# Patient Record
Sex: FEMALE | Race: WHITE | NOT HISPANIC OR LATINO | URBAN - METROPOLITAN AREA
[De-identification: names, ages, dates, MRNs, and addresses within clinical notes are randomized per-mention and may not be internally consistent; named-entity substitution may affect disease eponyms.]

---

## 2017-01-18 ENCOUNTER — OFFICE VISIT - HEALTHEAST (OUTPATIENT)
Dept: CARDIOLOGY | Facility: CLINIC | Age: 82
End: 2017-01-18

## 2017-01-18 DIAGNOSIS — I10 ESSENTIAL HYPERTENSION WITH GOAL BLOOD PRESSURE LESS THAN 140/90: ICD-10-CM

## 2017-01-18 DIAGNOSIS — E78.00 HYPERCHOLESTEREMIA: ICD-10-CM

## 2017-01-18 DIAGNOSIS — I25.10 CORONARY ARTERY DISEASE INVOLVING NATIVE CORONARY ARTERY OF NATIVE HEART WITHOUT ANGINA PECTORIS: ICD-10-CM

## 2017-01-18 DIAGNOSIS — I48.0 PAROXYSMAL ATRIAL FIBRILLATION (H): ICD-10-CM

## 2017-01-18 ASSESSMENT — MIFFLIN-ST. JEOR: SCORE: 1116.22

## 2017-01-27 ENCOUNTER — RECORDS - HEALTHEAST (OUTPATIENT)
Dept: LAB | Facility: CLINIC | Age: 82
End: 2017-01-27

## 2017-01-27 LAB
CHOLEST SERPL-MCNC: 93 MG/DL
FASTING STATUS PATIENT QL REPORTED: ABNORMAL
HDLC SERPL-MCNC: 47 MG/DL
LDLC SERPL CALC-MCNC: 33 MG/DL
TRIGL SERPL-MCNC: 66 MG/DL

## 2017-02-02 ENCOUNTER — AMBULATORY - HEALTHEAST (OUTPATIENT)
Dept: CARDIOLOGY | Facility: CLINIC | Age: 82
End: 2017-02-02

## 2017-02-02 ENCOUNTER — COMMUNICATION - HEALTHEAST (OUTPATIENT)
Dept: CARDIOLOGY | Facility: CLINIC | Age: 82
End: 2017-02-02

## 2017-02-02 ENCOUNTER — SURGERY - HEALTHEAST (OUTPATIENT)
Dept: CARDIOLOGY | Facility: CLINIC | Age: 82
End: 2017-02-02

## 2017-02-02 DIAGNOSIS — I48.0 PAROXYSMAL ATRIAL FIBRILLATION (H): ICD-10-CM

## 2017-02-15 ENCOUNTER — SURGERY - HEALTHEAST (OUTPATIENT)
Dept: CARDIOLOGY | Facility: CLINIC | Age: 82
End: 2017-02-15

## 2017-03-14 ENCOUNTER — COMMUNICATION - HEALTHEAST (OUTPATIENT)
Dept: CARDIOLOGY | Facility: CLINIC | Age: 82
End: 2017-03-14

## 2017-04-29 ENCOUNTER — OFFICE VISIT - HEALTHEAST (OUTPATIENT)
Dept: OCCUPATIONAL THERAPY | Facility: HOSPITAL | Age: 82
End: 2017-04-29

## 2017-05-04 ENCOUNTER — OFFICE VISIT - HEALTHEAST (OUTPATIENT)
Dept: GERIATRICS | Facility: CLINIC | Age: 82
End: 2017-05-04

## 2017-05-04 DIAGNOSIS — G47.00 INSOMNIA: ICD-10-CM

## 2017-05-04 DIAGNOSIS — F09 COGNITIVE DISORDER: ICD-10-CM

## 2017-05-04 DIAGNOSIS — G93.40 ENCEPHALOPATHY: ICD-10-CM

## 2017-05-04 DIAGNOSIS — R50.9 FEVER OF UNKNOWN ORIGIN: ICD-10-CM

## 2017-05-04 DIAGNOSIS — F41.8 ANXIETY ASSOCIATED WITH DEPRESSION: ICD-10-CM

## 2017-05-08 ENCOUNTER — AMBULATORY - HEALTHEAST (OUTPATIENT)
Dept: GERIATRICS | Facility: CLINIC | Age: 82
End: 2017-05-08

## 2017-08-30 ENCOUNTER — AMBULATORY - HEALTHEAST (OUTPATIENT)
Dept: CARDIOLOGY | Facility: CLINIC | Age: 82
End: 2017-08-30

## 2017-08-30 ENCOUNTER — RECORDS - HEALTHEAST (OUTPATIENT)
Dept: ADMINISTRATIVE | Facility: OTHER | Age: 82
End: 2017-08-30

## 2017-09-06 ENCOUNTER — AMBULATORY - HEALTHEAST (OUTPATIENT)
Dept: CARDIOLOGY | Facility: CLINIC | Age: 82
End: 2017-09-06

## 2017-09-06 ENCOUNTER — OFFICE VISIT - HEALTHEAST (OUTPATIENT)
Dept: CARDIOLOGY | Facility: CLINIC | Age: 82
End: 2017-09-06

## 2017-09-06 DIAGNOSIS — I10 ESSENTIAL HYPERTENSION WITH GOAL BLOOD PRESSURE LESS THAN 140/90: ICD-10-CM

## 2017-09-06 DIAGNOSIS — I48.91 ATRIAL FIBRILLATION (H): ICD-10-CM

## 2017-09-06 DIAGNOSIS — G45.9 TRANSIENT CEREBRAL ISCHEMIA, UNSPECIFIED TYPE: ICD-10-CM

## 2017-09-06 DIAGNOSIS — I25.10 CORONARY ARTERY DISEASE INVOLVING NATIVE CORONARY ARTERY OF NATIVE HEART WITHOUT ANGINA PECTORIS: ICD-10-CM

## 2017-09-06 DIAGNOSIS — Z95.1 S/P CABG X 3: ICD-10-CM

## 2017-09-06 DIAGNOSIS — I25.5 ISCHEMIC CARDIOMYOPATHY: ICD-10-CM

## 2017-09-06 DIAGNOSIS — E78.00 HYPERCHOLESTEREMIA: ICD-10-CM

## 2017-09-06 DIAGNOSIS — I48.0 PAROXYSMAL ATRIAL FIBRILLATION (H): ICD-10-CM

## 2017-09-06 ASSESSMENT — MIFFLIN-ST. JEOR: SCORE: 1143.44

## 2017-09-07 LAB
ATRIAL RATE - MUSE: 234 BPM
DIASTOLIC BLOOD PRESSURE - MUSE: NORMAL MMHG
INTERPRETATION ECG - MUSE: NORMAL
P AXIS - MUSE: NORMAL DEGREES
PR INTERVAL - MUSE: NORMAL MS
QRS DURATION - MUSE: 102 MS
QT - MUSE: 388 MS
QTC - MUSE: 474 MS
R AXIS - MUSE: 9 DEGREES
SYSTOLIC BLOOD PRESSURE - MUSE: NORMAL MMHG
T AXIS - MUSE: -41 DEGREES
VENTRICULAR RATE- MUSE: 90 BPM

## 2017-09-08 ENCOUNTER — HOSPITAL ENCOUNTER (OUTPATIENT)
Dept: CARDIOLOGY | Facility: HOSPITAL | Age: 82
Discharge: HOME | End: 2017-09-08
Attending: INTERNAL MEDICINE

## 2017-09-08 DIAGNOSIS — I48.0 PAROXYSMAL ATRIAL FIBRILLATION (H): ICD-10-CM

## 2017-12-12 ENCOUNTER — RECORDS - HEALTHEAST (OUTPATIENT)
Dept: LAB | Facility: CLINIC | Age: 82
End: 2017-12-12

## 2017-12-13 LAB
CHOLEST SERPL-MCNC: 105 MG/DL
FASTING STATUS PATIENT QL REPORTED: NORMAL
HDLC SERPL-MCNC: 53 MG/DL
LDLC SERPL CALC-MCNC: 37 MG/DL
TRIGL SERPL-MCNC: 77 MG/DL

## 2018-01-01 ENCOUNTER — AMBULATORY - HEALTHEAST (OUTPATIENT)
Dept: CARDIOLOGY | Facility: CLINIC | Age: 83
End: 2018-01-01

## 2018-01-01 ENCOUNTER — OFFICE VISIT - HEALTHEAST (OUTPATIENT)
Dept: CARDIOLOGY | Facility: CLINIC | Age: 83
End: 2018-01-01

## 2018-01-01 ENCOUNTER — HOME CARE/HOSPICE - HEALTHEAST (OUTPATIENT)
Dept: HOME HEALTH SERVICES | Facility: HOME HEALTH | Age: 83
End: 2018-01-01

## 2018-01-01 ENCOUNTER — COMMUNICATION - HEALTHEAST (OUTPATIENT)
Dept: CARDIOLOGY | Facility: CLINIC | Age: 83
End: 2018-01-01

## 2018-01-01 ENCOUNTER — RECORDS - HEALTHEAST (OUTPATIENT)
Dept: LAB | Facility: CLINIC | Age: 83
End: 2018-01-01

## 2018-01-01 ENCOUNTER — RECORDS - HEALTHEAST (OUTPATIENT)
Dept: ADMINISTRATIVE | Facility: OTHER | Age: 83
End: 2018-01-01

## 2018-01-01 DIAGNOSIS — I48.91 ATRIAL FIBRILLATION WITH RVR (H): ICD-10-CM

## 2018-01-01 DIAGNOSIS — I48.91 ATRIAL FIBRILLATION (H): ICD-10-CM

## 2018-01-01 DIAGNOSIS — I50.30 HEART FAILURE WITH PRESERVED EJECTION FRACTION (H): ICD-10-CM

## 2018-01-01 DIAGNOSIS — I10 ESSENTIAL HYPERTENSION: ICD-10-CM

## 2018-01-01 LAB
ALBUMIN SERPL-MCNC: 3.3 G/DL (ref 3.5–5)
ANION GAP SERPL CALCULATED.3IONS-SCNC: 10 MMOL/L (ref 5–18)
ANION GAP SERPL CALCULATED.3IONS-SCNC: 11 MMOL/L (ref 5–18)
ANION GAP SERPL CALCULATED.3IONS-SCNC: 7 MMOL/L (ref 5–18)
ANION GAP SERPL CALCULATED.3IONS-SCNC: 9 MMOL/L (ref 5–18)
BACTERIA SPEC CULT: ABNORMAL
BASOPHILS # BLD AUTO: 0.1 THOU/UL (ref 0–0.2)
BASOPHILS NFR BLD AUTO: 1 % (ref 0–2)
BUN SERPL-MCNC: 32 MG/DL (ref 8–28)
BUN SERPL-MCNC: 34 MG/DL (ref 8–28)
BUN SERPL-MCNC: 36 MG/DL (ref 8–28)
BUN SERPL-MCNC: 36 MG/DL (ref 8–28)
CALCIUM SERPL-MCNC: 9.3 MG/DL (ref 8.5–10.5)
CALCIUM SERPL-MCNC: 9.4 MG/DL (ref 8.5–10.5)
CALCIUM SERPL-MCNC: 9.5 MG/DL (ref 8.5–10.5)
CALCIUM SERPL-MCNC: 9.7 MG/DL (ref 8.5–10.5)
CHLORIDE BLD-SCNC: 104 MMOL/L (ref 98–107)
CHLORIDE BLD-SCNC: 104 MMOL/L (ref 98–107)
CHLORIDE BLD-SCNC: 105 MMOL/L (ref 98–107)
CHLORIDE BLD-SCNC: 105 MMOL/L (ref 98–107)
CO2 SERPL-SCNC: 28 MMOL/L (ref 22–31)
CO2 SERPL-SCNC: 29 MMOL/L (ref 22–31)
CO2 SERPL-SCNC: 30 MMOL/L (ref 22–31)
CO2 SERPL-SCNC: 31 MMOL/L (ref 22–31)
CREAT SERPL-MCNC: 1.13 MG/DL (ref 0.6–1.1)
CREAT SERPL-MCNC: 1.31 MG/DL (ref 0.6–1.1)
CREAT SERPL-MCNC: 1.44 MG/DL (ref 0.6–1.1)
CREAT SERPL-MCNC: 1.52 MG/DL (ref 0.6–1.1)
EOSINOPHIL # BLD AUTO: 0.1 THOU/UL (ref 0–0.4)
EOSINOPHIL NFR BLD AUTO: 3 % (ref 0–6)
ERYTHROCYTE [DISTWIDTH] IN BLOOD BY AUTOMATED COUNT: 15.9 % (ref 11–14.5)
GFR SERPL CREATININE-BSD FRML MDRD: 32 ML/MIN/1.73M2
GFR SERPL CREATININE-BSD FRML MDRD: 34 ML/MIN/1.73M2
GFR SERPL CREATININE-BSD FRML MDRD: 38 ML/MIN/1.73M2
GFR SERPL CREATININE-BSD FRML MDRD: 45 ML/MIN/1.73M2
GLUCOSE BLD-MCNC: 118 MG/DL (ref 70–125)
GLUCOSE BLD-MCNC: 82 MG/DL (ref 70–125)
GLUCOSE BLD-MCNC: 83 MG/DL (ref 70–125)
GLUCOSE BLD-MCNC: 87 MG/DL (ref 70–125)
HCT VFR BLD AUTO: 38 % (ref 35–47)
HGB BLD-MCNC: 12.3 G/DL (ref 12–16)
LYMPHOCYTES # BLD AUTO: 2.4 THOU/UL (ref 0.8–4.4)
LYMPHOCYTES NFR BLD AUTO: 46 % (ref 20–40)
MCH RBC QN AUTO: 32.5 PG (ref 27–34)
MCHC RBC AUTO-ENTMCNC: 32.4 G/DL (ref 32–36)
MCV RBC AUTO: 101 FL (ref 80–100)
MONOCYTES # BLD AUTO: 0.6 THOU/UL (ref 0–0.9)
MONOCYTES NFR BLD AUTO: 12 % (ref 2–10)
NEUTROPHILS # BLD AUTO: 1.9 THOU/UL (ref 2–7.7)
NEUTROPHILS NFR BLD AUTO: 38 % (ref 50–70)
PHOSPHATE SERPL-MCNC: 3.3 MG/DL (ref 2.5–4.5)
PLATELET # BLD AUTO: 243 THOU/UL (ref 140–440)
PMV BLD AUTO: 11.1 FL (ref 8.5–12.5)
POTASSIUM BLD-SCNC: 3.4 MMOL/L (ref 3.5–5)
POTASSIUM BLD-SCNC: 3.5 MMOL/L (ref 3.5–5)
POTASSIUM BLD-SCNC: 3.6 MMOL/L (ref 3.5–5)
POTASSIUM BLD-SCNC: 4.1 MMOL/L (ref 3.5–5)
RBC # BLD AUTO: 3.78 MILL/UL (ref 3.8–5.4)
SODIUM SERPL-SCNC: 143 MMOL/L (ref 136–145)
SODIUM SERPL-SCNC: 144 MMOL/L (ref 136–145)
WBC: 5.1 THOU/UL (ref 4–11)

## 2018-01-01 ASSESSMENT — MIFFLIN-ST. JEOR: SCORE: 1125.29

## 2018-06-12 ENCOUNTER — RECORDS - HEALTHEAST (OUTPATIENT)
Dept: LAB | Facility: CLINIC | Age: 83
End: 2018-06-12

## 2018-06-12 LAB
ALBUMIN SERPL-MCNC: 3.5 G/DL (ref 3.5–5)
ANION GAP SERPL CALCULATED.3IONS-SCNC: 12 MMOL/L (ref 5–18)
BUN SERPL-MCNC: 24 MG/DL (ref 8–28)
CALCIUM SERPL-MCNC: 9.5 MG/DL (ref 8.5–10.5)
CHLORIDE BLD-SCNC: 107 MMOL/L (ref 98–107)
CO2 SERPL-SCNC: 25 MMOL/L (ref 22–31)
CREAT SERPL-MCNC: 1.13 MG/DL (ref 0.6–1.1)
GFR SERPL CREATININE-BSD FRML MDRD: 45 ML/MIN/1.73M2
GLUCOSE BLD-MCNC: 93 MG/DL (ref 70–125)
PHOSPHATE SERPL-MCNC: 2.9 MG/DL (ref 2.5–4.5)
POTASSIUM BLD-SCNC: 3.9 MMOL/L (ref 3.5–5)
SODIUM SERPL-SCNC: 144 MMOL/L (ref 136–145)

## 2018-07-25 ENCOUNTER — AMBULATORY - HEALTHEAST (OUTPATIENT)
Dept: CARDIOLOGY | Facility: CLINIC | Age: 83
End: 2018-07-25

## 2018-07-31 ENCOUNTER — RECORDS - HEALTHEAST (OUTPATIENT)
Dept: ADMINISTRATIVE | Facility: OTHER | Age: 83
End: 2018-07-31

## 2018-07-31 ENCOUNTER — OFFICE VISIT - HEALTHEAST (OUTPATIENT)
Dept: CARDIOLOGY | Facility: CLINIC | Age: 83
End: 2018-07-31

## 2018-07-31 DIAGNOSIS — I48.0 PAROXYSMAL ATRIAL FIBRILLATION (H): ICD-10-CM

## 2018-07-31 DIAGNOSIS — Z95.1 S/P CABG X 3: ICD-10-CM

## 2018-07-31 DIAGNOSIS — I25.10 CORONARY ARTERY DISEASE INVOLVING NATIVE CORONARY ARTERY OF NATIVE HEART WITHOUT ANGINA PECTORIS: ICD-10-CM

## 2018-07-31 DIAGNOSIS — E78.00 HYPERCHOLESTEREMIA: ICD-10-CM

## 2018-07-31 DIAGNOSIS — I50.9 CHF (CONGESTIVE HEART FAILURE) (H): ICD-10-CM

## 2018-07-31 DIAGNOSIS — I25.5 ISCHEMIC CARDIOMYOPATHY: ICD-10-CM

## 2018-07-31 DIAGNOSIS — I10 ESSENTIAL HYPERTENSION: ICD-10-CM

## 2018-07-31 LAB
ALT SERPL W P-5'-P-CCNC: 18 U/L (ref 0–45)
ANION GAP SERPL CALCULATED.3IONS-SCNC: 10 MMOL/L (ref 5–18)
AST SERPL W P-5'-P-CCNC: 34 U/L (ref 0–40)
BNP SERPL-MCNC: 1000 PG/ML (ref 0–167)
BUN SERPL-MCNC: 28 MG/DL (ref 8–28)
CALCIUM SERPL-MCNC: 9 MG/DL (ref 8.5–10.5)
CHLORIDE BLD-SCNC: 107 MMOL/L (ref 98–107)
CO2 SERPL-SCNC: 25 MMOL/L (ref 22–31)
CREAT SERPL-MCNC: 1.03 MG/DL (ref 0.6–1.1)
GFR SERPL CREATININE-BSD FRML MDRD: 50 ML/MIN/1.73M2
GLUCOSE BLD-MCNC: 98 MG/DL (ref 70–125)
POTASSIUM BLD-SCNC: 3.5 MMOL/L (ref 3.5–5)
SODIUM SERPL-SCNC: 142 MMOL/L (ref 136–145)

## 2018-07-31 ASSESSMENT — MIFFLIN-ST. JEOR: SCORE: 1172.92

## 2018-08-01 ENCOUNTER — AMBULATORY - HEALTHEAST (OUTPATIENT)
Dept: CARDIOLOGY | Facility: CLINIC | Age: 83
End: 2018-08-01

## 2018-08-01 DIAGNOSIS — R79.89 ELEVATED BRAIN NATRIURETIC PEPTIDE (BNP) LEVEL: ICD-10-CM

## 2018-08-01 DIAGNOSIS — I50.9 CHF (CONGESTIVE HEART FAILURE) (H): ICD-10-CM

## 2018-08-07 ENCOUNTER — RECORDS - HEALTHEAST (OUTPATIENT)
Dept: LAB | Facility: CLINIC | Age: 83
End: 2018-08-07

## 2018-08-07 LAB
ALBUMIN SERPL-MCNC: 3.5 G/DL (ref 3.5–5)
ANION GAP SERPL CALCULATED.3IONS-SCNC: 12 MMOL/L (ref 5–18)
BUN SERPL-MCNC: 30 MG/DL (ref 8–28)
CALCIUM SERPL-MCNC: 9.3 MG/DL (ref 8.5–10.5)
CHLORIDE BLD-SCNC: 110 MMOL/L (ref 98–107)
CO2 SERPL-SCNC: 23 MMOL/L (ref 22–31)
CREAT SERPL-MCNC: 1.04 MG/DL (ref 0.6–1.1)
GFR SERPL CREATININE-BSD FRML MDRD: 50 ML/MIN/1.73M2
GLUCOSE BLD-MCNC: 119 MG/DL (ref 70–125)
PHOSPHATE SERPL-MCNC: 2.7 MG/DL (ref 2.5–4.5)
POTASSIUM BLD-SCNC: 3.5 MMOL/L (ref 3.5–5)
SODIUM SERPL-SCNC: 145 MMOL/L (ref 136–145)

## 2018-08-08 LAB — BNP SERPL-MCNC: 935 PG/ML (ref 0–167)

## 2018-08-14 ENCOUNTER — AMBULATORY - HEALTHEAST (OUTPATIENT)
Dept: CARDIOLOGY | Facility: CLINIC | Age: 83
End: 2018-08-14

## 2018-08-14 ENCOUNTER — COMMUNICATION - HEALTHEAST (OUTPATIENT)
Dept: CARDIOLOGY | Facility: CLINIC | Age: 83
End: 2018-08-14

## 2019-01-01 ENCOUNTER — HOSPITAL ENCOUNTER (OUTPATIENT)
Dept: NUCLEAR MEDICINE | Facility: HOSPITAL | Age: 84
Discharge: HOME | End: 2019-05-17
Attending: INTERNAL MEDICINE

## 2019-01-01 ENCOUNTER — HOME CARE/HOSPICE - HEALTHEAST (OUTPATIENT)
Dept: HOSPICE | Facility: HOSPICE | Age: 84
End: 2019-01-01

## 2019-01-01 ENCOUNTER — AMBULATORY - HEALTHEAST (OUTPATIENT)
Dept: HOSPICE | Facility: HOSPICE | Age: 84
End: 2019-01-01

## 2019-01-01 ENCOUNTER — AMBULATORY - HEALTHEAST (OUTPATIENT)
Dept: CARDIOLOGY | Facility: CLINIC | Age: 84
End: 2019-01-01

## 2019-01-01 ENCOUNTER — RECORDS - HEALTHEAST (OUTPATIENT)
Dept: LAB | Facility: CLINIC | Age: 84
End: 2019-01-01

## 2019-01-01 ENCOUNTER — RECORDS - HEALTHEAST (OUTPATIENT)
Dept: ADMINISTRATIVE | Facility: OTHER | Age: 84
End: 2019-01-01

## 2019-01-01 ENCOUNTER — COMMUNICATION - HEALTHEAST (OUTPATIENT)
Dept: CARDIOLOGY | Facility: CLINIC | Age: 84
End: 2019-01-01

## 2019-01-01 ENCOUNTER — AMBULATORY - HEALTHEAST (OUTPATIENT)
Dept: FAMILY MEDICINE | Facility: CLINIC | Age: 84
End: 2019-01-01

## 2019-01-01 ENCOUNTER — OFFICE VISIT - HEALTHEAST (OUTPATIENT)
Dept: CARDIOLOGY | Facility: CLINIC | Age: 84
End: 2019-01-01

## 2019-01-01 ENCOUNTER — HOSPITAL ENCOUNTER (OUTPATIENT)
Dept: CARDIOLOGY | Facility: HOSPITAL | Age: 84
Discharge: HOME | End: 2019-05-17
Attending: INTERNAL MEDICINE

## 2019-01-01 DIAGNOSIS — I50.30 HEART FAILURE WITH PRESERVED EJECTION FRACTION (H): ICD-10-CM

## 2019-01-01 DIAGNOSIS — I25.5 ISCHEMIC CARDIOMYOPATHY: ICD-10-CM

## 2019-01-01 DIAGNOSIS — I25.10 CORONARY ARTERY DISEASE INVOLVING NATIVE CORONARY ARTERY OF NATIVE HEART WITHOUT ANGINA PECTORIS: ICD-10-CM

## 2019-01-01 DIAGNOSIS — Z95.1 S/P CABG X 3: ICD-10-CM

## 2019-01-01 DIAGNOSIS — I10 ESSENTIAL HYPERTENSION: ICD-10-CM

## 2019-01-01 DIAGNOSIS — N28.9 RENAL INSUFFICIENCY: ICD-10-CM

## 2019-01-01 DIAGNOSIS — I07.1 TRICUSPID VALVE INSUFFICIENCY, UNSPECIFIED ETIOLOGY: ICD-10-CM

## 2019-01-01 DIAGNOSIS — I48.0 PAROXYSMAL ATRIAL FIBRILLATION (H): ICD-10-CM

## 2019-01-01 DIAGNOSIS — E78.00 HYPERCHOLESTEREMIA: ICD-10-CM

## 2019-01-01 DIAGNOSIS — I50.9 ACUTE CONGESTIVE HEART FAILURE, UNSPECIFIED HEART FAILURE TYPE (H): ICD-10-CM

## 2019-01-01 LAB
ALBUMIN UR-MCNC: NEGATIVE MG/DL
ALBUMIN UR-MCNC: NEGATIVE MG/DL
ALT SERPL W P-5'-P-CCNC: 18 U/L (ref 0–45)
ANION GAP SERPL CALCULATED.3IONS-SCNC: 10 MMOL/L (ref 5–18)
ANION GAP SERPL CALCULATED.3IONS-SCNC: 11 MMOL/L (ref 5–18)
ANION GAP SERPL CALCULATED.3IONS-SCNC: 9 MMOL/L (ref 5–18)
ANION GAP SERPL CALCULATED.3IONS-SCNC: 9 MMOL/L (ref 5–18)
APPEARANCE UR: ABNORMAL
APPEARANCE UR: CLEAR
BACTERIA #/AREA URNS HPF: ABNORMAL HPF
BACTERIA #/AREA URNS HPF: ABNORMAL HPF
BACTERIA SPEC CULT: ABNORMAL
BACTERIA SPEC CULT: ABNORMAL
BACTERIA SPEC CULT: NORMAL
BILIRUB UR QL STRIP: NEGATIVE
BILIRUB UR QL STRIP: NEGATIVE
BNP SERPL-MCNC: 1038 PG/ML (ref 0–167)
BNP SERPL-MCNC: 838 PG/ML (ref 0–167)
BUN SERPL-MCNC: 30 MG/DL (ref 8–28)
BUN SERPL-MCNC: 30 MG/DL (ref 8–28)
BUN SERPL-MCNC: 34 MG/DL (ref 8–28)
BUN SERPL-MCNC: 35 MG/DL (ref 8–28)
CALCIUM SERPL-MCNC: 8.9 MG/DL (ref 8.5–10.5)
CALCIUM SERPL-MCNC: 9.2 MG/DL (ref 8.5–10.5)
CALCIUM SERPL-MCNC: 9.2 MG/DL (ref 8.5–10.5)
CALCIUM SERPL-MCNC: 9.6 MG/DL (ref 8.5–10.5)
CHLORIDE BLD-SCNC: 105 MMOL/L (ref 98–107)
CHLORIDE BLD-SCNC: 105 MMOL/L (ref 98–107)
CHLORIDE BLD-SCNC: 106 MMOL/L (ref 98–107)
CHLORIDE BLD-SCNC: 108 MMOL/L (ref 98–107)
CK SERPL-CCNC: 46 U/L (ref 30–190)
CO2 SERPL-SCNC: 26 MMOL/L (ref 22–31)
CO2 SERPL-SCNC: 27 MMOL/L (ref 22–31)
CO2 SERPL-SCNC: 27 MMOL/L (ref 22–31)
CO2 SERPL-SCNC: 28 MMOL/L (ref 22–31)
COLOR UR AUTO: ABNORMAL
COLOR UR AUTO: YELLOW
CREAT SERPL-MCNC: 1.34 MG/DL (ref 0.6–1.1)
CREAT SERPL-MCNC: 1.37 MG/DL (ref 0.6–1.1)
CREAT SERPL-MCNC: 1.45 MG/DL (ref 0.6–1.1)
CREAT SERPL-MCNC: 1.56 MG/DL (ref 0.6–1.1)
CV STRESS CURRENT BP HE: NORMAL
CV STRESS CURRENT HR HE: 102
CV STRESS CURRENT HR HE: 77
CV STRESS CURRENT HR HE: 81
CV STRESS CURRENT HR HE: 84
CV STRESS CURRENT HR HE: 84
CV STRESS CURRENT HR HE: 88
CV STRESS CURRENT HR HE: 90
CV STRESS CURRENT HR HE: 90
CV STRESS CURRENT HR HE: 91
CV STRESS CURRENT HR HE: 92
CV STRESS CURRENT HR HE: 94
CV STRESS CURRENT HR HE: 94
CV STRESS CURRENT HR HE: 95
CV STRESS CURRENT HR HE: 96
CV STRESS CURRENT HR HE: 97
CV STRESS CURRENT HR HE: 98
CV STRESS CURRENT HR HE: 99
CV STRESS DEVIATION TIME HE: NORMAL
CV STRESS ECHO PERCENT HR HE: NORMAL
CV STRESS EXERCISE STAGE HE: NORMAL
CV STRESS FINAL RESTING BP HE: NORMAL
CV STRESS FINAL RESTING HR HE: 96
CV STRESS MAX HR HE: 102
CV STRESS MAX TREADMILL GRADE HE: 0
CV STRESS MAX TREADMILL SPEED HE: 0
CV STRESS PEAK DIA BP HE: NORMAL
CV STRESS PEAK SYS BP HE: NORMAL
CV STRESS PHASE HE: NORMAL
CV STRESS PROTOCOL HE: NORMAL
CV STRESS RESTING PT POSITION HE: NORMAL
CV STRESS ST DEVIATION AMOUNT HE: NORMAL
CV STRESS ST DEVIATION ELEVATION HE: NORMAL
CV STRESS ST EVELATION AMOUNT HE: NORMAL
CV STRESS TEST TYPE HE: NORMAL
CV STRESS TOTAL STAGE TIME MIN 1 HE: NORMAL
GFR SERPL CREATININE-BSD FRML MDRD: 31 ML/MIN/1.73M2
GFR SERPL CREATININE-BSD FRML MDRD: 34 ML/MIN/1.73M2
GFR SERPL CREATININE-BSD FRML MDRD: 36 ML/MIN/1.73M2
GFR SERPL CREATININE-BSD FRML MDRD: 37 ML/MIN/1.73M2
GLUCOSE BLD-MCNC: 101 MG/DL (ref 70–125)
GLUCOSE BLD-MCNC: 105 MG/DL (ref 70–125)
GLUCOSE BLD-MCNC: 108 MG/DL (ref 70–125)
GLUCOSE BLD-MCNC: 132 MG/DL (ref 70–125)
GLUCOSE UR STRIP-MCNC: NEGATIVE MG/DL
GLUCOSE UR STRIP-MCNC: NEGATIVE MG/DL
HGB BLD-MCNC: 10.3 G/DL (ref 12–16)
HGB BLD-MCNC: 10.9 G/DL (ref 12–16)
HGB UR QL STRIP: ABNORMAL
HGB UR QL STRIP: NEGATIVE
HYALINE CASTS #/AREA URNS LPF: ABNORMAL LPF
KETONES UR STRIP-MCNC: NEGATIVE MG/DL
KETONES UR STRIP-MCNC: NEGATIVE MG/DL
LEUKOCYTE ESTERASE UR QL STRIP: ABNORMAL
LEUKOCYTE ESTERASE UR QL STRIP: ABNORMAL
MUCOUS THREADS #/AREA URNS LPF: ABNORMAL LPF
MUCOUS THREADS #/AREA URNS LPF: ABNORMAL LPF
NITRATE UR QL: NEGATIVE
NITRATE UR QL: NEGATIVE
NUC STRESS EJECTION FRACTION: 56 %
PH UR STRIP: 5.5 [PH] (ref 4.5–8)
PH UR STRIP: 6 [PH] (ref 4.5–8)
POTASSIUM BLD-SCNC: 2.9 MMOL/L (ref 3.5–5)
POTASSIUM BLD-SCNC: 3.5 MMOL/L (ref 3.5–5)
POTASSIUM BLD-SCNC: 3.9 MMOL/L (ref 3.5–5)
POTASSIUM BLD-SCNC: 4 MMOL/L (ref 3.5–5)
RBC #/AREA URNS AUTO: ABNORMAL HPF
RBC #/AREA URNS AUTO: ABNORMAL HPF
SODIUM SERPL-SCNC: 141 MMOL/L (ref 136–145)
SODIUM SERPL-SCNC: 143 MMOL/L (ref 136–145)
SODIUM SERPL-SCNC: 143 MMOL/L (ref 136–145)
SODIUM SERPL-SCNC: 144 MMOL/L (ref 136–145)
SP GR UR STRIP: 1.01 (ref 1–1.03)
SP GR UR STRIP: 1.02 (ref 1–1.03)
SQUAMOUS #/AREA URNS AUTO: ABNORMAL LPF
SQUAMOUS #/AREA URNS AUTO: ABNORMAL LPF
STRESS ECHO BASELINE BP: NORMAL
STRESS ECHO BASELINE HR: 78
STRESS ECHO CALCULATED PERCENT HR: 78 %
STRESS ECHO LAST STRESS BP: NORMAL
STRESS ECHO LAST STRESS HR: 84
TRANS CELLS #/AREA URNS HPF: ABNORMAL LPF
UROBILINOGEN UR STRIP-ACNC: ABNORMAL
UROBILINOGEN UR STRIP-ACNC: ABNORMAL
WBC #/AREA URNS AUTO: >100 HPF
WBC #/AREA URNS AUTO: ABNORMAL HPF
WBC CLUMPS #/AREA URNS HPF: PRESENT /[HPF]
WBC CLUMPS #/AREA URNS HPF: PRESENT /[HPF]

## 2019-01-01 RX ORDER — LORAZEPAM 0.5 MG/1
0.5 TABLET ORAL EVERY 4 HOURS PRN
Status: SHIPPED | COMMUNITY
Start: 2019-01-01

## 2019-01-01 RX ORDER — ATROPINE SULFATE 10 MG/ML
1 SOLUTION/ DROPS OPHTHALMIC EVERY 4 HOURS PRN
Status: SHIPPED | COMMUNITY
Start: 2019-01-01

## 2019-01-01 RX ORDER — HYDROMORPHONE HYDROCHLORIDE 2 MG/1
2 TABLET ORAL EVERY 4 HOURS
Status: SHIPPED | COMMUNITY
Start: 2019-01-01

## 2019-01-01 RX ORDER — POTASSIUM CHLORIDE 1500 MG/1
20 TABLET, EXTENDED RELEASE ORAL 2 TIMES DAILY
Qty: 60 TABLET | Refills: 1 | Status: SHIPPED
Start: 2019-01-01

## 2019-01-01 RX ORDER — BISACODYL 10 MG
10 SUPPOSITORY, RECTAL RECTAL DAILY PRN
Status: SHIPPED | COMMUNITY
Start: 2019-01-01

## 2019-01-01 ASSESSMENT — MIFFLIN-ST. JEOR
SCORE: 1107.15
SCORE: 1070.86
SCORE: 1098.08
SCORE: 1057.26

## 2021-05-24 ENCOUNTER — RECORDS - HEALTHEAST (OUTPATIENT)
Dept: ADMINISTRATIVE | Facility: CLINIC | Age: 86
End: 2021-05-24

## 2021-05-25 ENCOUNTER — RECORDS - HEALTHEAST (OUTPATIENT)
Dept: ADMINISTRATIVE | Facility: CLINIC | Age: 86
End: 2021-05-25

## 2021-05-26 ENCOUNTER — RECORDS - HEALTHEAST (OUTPATIENT)
Dept: ADMINISTRATIVE | Facility: CLINIC | Age: 86
End: 2021-05-26

## 2021-05-26 NOTE — PATIENT INSTRUCTIONS - HE
Grace Robb,    It was a pleasure to see you today at the [facility].     My recommendations after this visit include:  - Please follow up with Dr Barba in May   - Please follow up with Lisa Hammer in 4 months  - I have checked labs and will contact you with the results  - I changed your bumex to 2 mg daily    Lisa Hammer, CNP

## 2021-05-27 ENCOUNTER — RECORDS - HEALTHEAST (OUTPATIENT)
Dept: ADMINISTRATIVE | Facility: CLINIC | Age: 86
End: 2021-05-27

## 2021-05-28 ENCOUNTER — RECORDS - HEALTHEAST (OUTPATIENT)
Dept: ADMINISTRATIVE | Facility: CLINIC | Age: 86
End: 2021-05-28

## 2021-05-28 NOTE — PROGRESS NOTES
[facility] Follow-up Note    Assessment & Plan        1. Coronary artery disease involving native coronary artery of native heart without angina pectoris  -this is of 3 vessels.  In June 2004 she had coronary artery bypass grafting with a vein graft to an OM, vein graft to PDA, and a vein graft to diagonal, and a LIMA to the LAD.  In July 2004 she had occlusion of all those grafts and had stenting of the LIMA graft to the LAD, balloon angioplasty to distal OM and stenting of the proximal OM.  She is on chronic Plavix therapy and had no symptoms.  In 2011 stress test showed no ischemia.  Given that this was 14 years ago and she is having heart failure now with diminished ejection fraction I discussed pursuing stress testing and she was very hesitant to perform stress testing.  In addition, she is not getting better with diuretics so I will pursue repeat stress testing and possible intervention although somewhat risky at her advanced age and renal dysfunction.     2. Paroxysmal atrial fibrillation (H) -asymptomatic not valvular and was controlled on propafenone but I lowered the dose since I do not like with coronary artery disease and she wanted to keep it going. I did discontinue the propafenone, as a lengthy conversation she has acquiesced to going back on Eliquis 5 mg twice a day.  She does not want Coumadin secondary to bleeding in the past and declines Watchman device.    No current symptoms.   3. Hypercholesteremia -LDL was excellent at 37 from December 2017 but given her age I will go ahead and discontinue the fenofibrate.  I will check an ALT and CPK just in case.   4. Hypertension -under good control currently.   5. Ischemic cardiomyopathy  -initial ejection fraction 63% but recent echo suggested 50-55%.  Once she pursues pharmacological nuclear stress test to look for ongoing ischemia and possible need for intervention we will have good ejection fraction.   6. Renal insufficiency  -creatinine giving GFR of about 30, was 40 last year and 50 the year before that.   7. S/P CABG x 3 -as above LIMA to the LAD, vein graft to PDA, vein graft to OM, and vein graft to diagonal all of which are occluded immediately within 6 months of surgery.  As above chronic Eliquis and Plavix therapy and lipid therapy.   8. Acute congestive heart failure, unspecified heart failure type (H) -not really any better given Bumex twice a day and I will check BNP.  BNP still elevated will increase Bumex to 3 mg every morning.     Plan  1.  Check BNP and if elevated increase Bumex to 3 mg in the morning.  2.  Check renal profile, ALT as well as CPK.  3.  Stop fenofibrate.  4.  Pharmacological stress test and angiography if significant ischemia.  5.  Follow-up with me in 3 months given all these issues.    Subjective  CC: 9-year-old white female here for follow-up today.  She is still a resident of [facility], short of breath and minimal activity and extremely fatigued.  She has several days which she just wants to stay in bed.  She has no significant chest discomfort, PND, orthopnea, palpitations, peripheral edema, syncope or dizziness.    Medications  Current Outpatient Medications   Medication Sig     acetaminophen (TYLENOL) 500 MG tablet Take 500 mg by mouth every 4 (four) hours as needed for pain.     apixaban (ELIQUIS) 5 mg Tab tablet Take 1 tablet (5 mg total) by mouth 2 (two) times a day.     atorvastatin (LIPITOR) 80 MG tablet Take 80 mg by mouth at bedtime. (Take half a tablet (40mg) at bedtime           bumetanide (BUMEX) 1 MG tablet Take 2 tablets (2 mg total) by mouth daily.     clopidogrel (PLAVIX) 75 mg tablet Take 75 mg by mouth every morning.      fenofibrate (TRICOR) 145 MG tablet Take 145 mg by mouth every morning.      glucosamine-chondroitin 500-400 mg cap Take 2 capsules by mouth daily .           guaiFENesin ER (MUCINEX) 600 mg 12 hr tablet Take 600 mg by mouth 2 (two) times a day  as needed for congestion.     isosorbide mononitrate (IMDUR) 30 MG 24 hr tablet Take 30 mg by mouth every morning.      melatonin 3 mg Tab tablet Take 3 mg by mouth at bedtime.      metoprolol tartrate (LOPRESSOR) 25 MG tablet Take 0.5 tablets (12.5 mg total) by mouth 2 (two) times a day.     multivitamin therapeutic (THERAGRAN) tablet Take 1 tablet by mouth every morning.      nitroglycerin (NITROSTAT) 0.4 MG SL tablet Place 0.4 mg under the tongue every 5 (five) minutes as needed for chest pain.     oxybutynin (DITROPAN XL) 15 MG 24 hr tablet Take 15 mg by mouth every morning.      pantoprazole (PROTONIX) 20 MG tablet Take 20 mg by mouth every evening .           PARoxetine (PAXIL) 10 MG tablet Take 5 mg by mouth every morning.      traZODone (DESYREL) 50 MG tablet TAKE 0.5 TABLET(S) BY MOUTH AT BEDTIME     traZODone (DESYREL) 50 MG tablet Take 25 mg by mouth at bedtime as needed for sleep (additional prn dose on top of regularly scheduled trazodone dose) .             Objective  /50 (Patient Site: Left Arm, Patient Position: Sitting, Cuff Size: Adult Regular)   Pulse 72   Resp 16   Ht 5' (1.524 m)   Wt 160 lb (72.6 kg)   BMI 31.25 kg/m      General Appearance:    Alert, cooperative, no distress, appears stated age   Head:    Normocephalic, without obvious abnormality, atraumatic   Throat:   Lips, mucosa, and tongue normal; teeth and gums normal   Neck:   Supple, symmetrical, trachea midline, no adenopathy;        thyroid:  No enlargement/tenderness/nodules; no carotid    bruit or JVD   Back:     Symmetric, no curvature, ROM normal, no CVA tenderness   Lungs:     Clear to auscultation bilaterally, respirations unlabored   Chest wall:    No tenderness, midline sternotomy scar   Heart:    Regular rate and rhythm, S1 and S2 normal, no murmur, rub   or gallop   Abdomen:     Soft, non-tender, bowel sounds active all four quadrants,     no masses, no organomegaly   Extremities:   Normal, atraumatic, no  cyanosis or edema   Pulses:   2+ and symmetric all extremities   Skin:   Skin color, texture, turgor normal, no rashes or lesions     Results    Lab Results personally reviewed   Lab Results   Component Value Date    CHOL 105 12/12/2017    CHOL 93 01/27/2017     Lab Results   Component Value Date    HDL 53 12/12/2017    HDL 47 (L) 01/27/2017     Lab Results   Component Value Date    LDLCALC 37 12/12/2017    LDLCALC 33 01/27/2017     Lab Results   Component Value Date    TRIG 77 12/12/2017    TRIG 66 01/27/2017     Lab Results   Component Value Date    WBC 5.1 11/28/2018    HGB 10.9 (L) 02/11/2019    HCT 38.0 11/28/2018     11/28/2018     Lab Results   Component Value Date    CREATININE 1.56 (H) 03/22/2019    BUN 35 (H) 03/22/2019     03/22/2019    K 3.5 03/22/2019    CO2 28 03/22/2019     Review of Systems:   General: WNL  Eyes: WNL  Ears/Nose/Throat: WNL  Lungs: WNL  Heart: WNL  Stomach: WNL  Bladder: WNL  Muscle/Joints: WNL  Skin: WNL  Nervous System: WNL  Mental Health: WNL     Blood: WNL

## 2021-05-28 NOTE — PATIENT INSTRUCTIONS - HE
Ms Grace Robb,  I enjoyed visiting with you again today.  I am concerned you are not doing as well as you had in past.  Per our conversation I will check blood work on you today and stress test and go forth from there.  I will plan on seeing you several months.  Vincent Barba

## 2021-05-28 NOTE — TELEPHONE ENCOUNTER
Received phone call from nurse at [facility]. Confirmed orders. Msg sent to schedulers to arrange follow-up with DB at the end of this week or early next week.

## 2021-05-28 NOTE — TELEPHONE ENCOUNTER
----- Message from Ginna Barba MD sent at 5/13/2019 11:25 AM CDT -----  Please contact patient or daughter.  Renal blood work about the same, other blood work looks great however potassium extremely low.  Can we get her some potassium supplements probably 20 mEq of potassium chloride twice a day, would like to have the potassium checked in 2 to 3 days with a renal profile again please.  LF

## 2021-05-28 NOTE — TELEPHONE ENCOUNTER
Called patient listed cell phone and spoke with her daughter, Mamae. Pt lives in [place] where they manage her medications. Called [facility] and spoke with Josselin. Orders faxed to them regarding potassium supplementation and repeat BMP. Added BMP to nuclear stress test in appointment info. -[facility] [place]

## 2021-05-28 NOTE — TELEPHONE ENCOUNTER
-- Message -----  From: Ginna Barba MD  Sent: 5/14/2019   4:04 PM  To: Tami Kay RN    Sounds like it would be a financial hardship, let us try Bumex 4 mg for 2 AM's, then go back to 2 mg once a day.  I would still like her to have an appointment heart failure nurse practitioners to check a renal profile either the end of this week or beginning of next week.  LF  ----- Message -----  From: Tami Kay RN  Sent: 5/14/2019   2:27 PM  To: Ginna Barba MD    Hi,  I spoke with daughter Maame yesterday and the care home yesterday and today. They are starting the K supplementation. They are agreeable to increasing Bumex to 2mg twice a day, the only issue is that she only has a twice a day medication pass on her package. She would get the second dose of Bumex at 9pm. The family would have to pay extra for another medication pass in the afternoon.. Can she take all 4 mg in the AM? Or any other recommendations? I guess the [place] went through this before with her and they did not want to add additional services and that is why she takes it once daily now.   Thanks,  Mal           * Called back [facility] RN office- LM for RN. Bumex to just be 4 mg x2 days and then back to 2 mg daily thereafter. Left [facility]'s number to call back to make sure they got the message. Medication list updated. Rx [place] to [facility]. [place] [place],  This is in case the RN from [facility] calls you back. I faxed over the rx from [facility] and noted that the Bumex needs to be increased just for 2 days. Maame, the patient's sister, should be getting a call to schedule a HF NP appt next week.   Thanks!  Ananth

## 2021-05-29 ENCOUNTER — RECORDS - HEALTHEAST (OUTPATIENT)
Dept: ADMINISTRATIVE | Facility: CLINIC | Age: 86
End: 2021-05-29

## 2021-05-29 NOTE — PATIENT INSTRUCTIONS - HE
Grace Robb,    It was a pleasure to see you today at the [facility].     My recommendations after this visit include:  - Please follow up with Dr Barba in August   - Please follow up with Lisa Hammer in 4 months   - Please restart bumex 2 mg daily       Lisa Hammer, CNP

## 2021-05-30 VITALS — HEIGHT: 60 IN | WEIGHT: 173 LBS | BODY MASS INDEX: 33.96 KG/M2

## 2021-05-31 VITALS — BODY MASS INDEX: 29.86 KG/M2 | HEIGHT: 63 IN | WEIGHT: 168.5 LBS

## 2021-06-01 VITALS — HEIGHT: 63 IN | WEIGHT: 175 LBS | BODY MASS INDEX: 31.01 KG/M2

## 2021-06-02 ENCOUNTER — RECORDS - HEALTHEAST (OUTPATIENT)
Dept: ADMINISTRATIVE | Facility: CLINIC | Age: 86
End: 2021-06-02

## 2021-06-02 VITALS — HEIGHT: 60 IN | BODY MASS INDEX: 31.41 KG/M2 | WEIGHT: 160 LBS

## 2021-06-02 VITALS — WEIGHT: 171 LBS | HEIGHT: 60 IN | BODY MASS INDEX: 33.57 KG/M2

## 2021-06-02 VITALS — BODY MASS INDEX: 33.2 KG/M2 | WEIGHT: 170 LBS

## 2021-06-02 VITALS — WEIGHT: 175 LBS | BODY MASS INDEX: 34.36 KG/M2 | HEIGHT: 60 IN

## 2021-06-02 VITALS — BODY MASS INDEX: 32 KG/M2 | HEIGHT: 60 IN | WEIGHT: 163 LBS

## 2021-06-03 VITALS — WEIGHT: 175.13 LBS | BODY MASS INDEX: 34.2 KG/M2

## 2021-06-03 VITALS — WEIGHT: 179 LBS | BODY MASS INDEX: 34.96 KG/M2

## 2021-06-03 VITALS — WEIGHT: 178.2 LBS | BODY MASS INDEX: 34.8 KG/M2

## 2021-06-03 VITALS — WEIGHT: 180.2 LBS | BODY MASS INDEX: 35.19 KG/M2

## 2021-06-03 VITALS — BODY MASS INDEX: 34.8 KG/M2 | WEIGHT: 178.2 LBS

## 2021-06-03 VITALS — WEIGHT: 178.6 LBS | BODY MASS INDEX: 34.88 KG/M2

## 2021-06-03 VITALS — HEIGHT: 60 IN | BODY MASS INDEX: 33.18 KG/M2 | WEIGHT: 169 LBS

## 2021-06-03 VITALS — BODY MASS INDEX: 35.74 KG/M2 | WEIGHT: 183 LBS

## 2021-06-08 NOTE — PROGRESS NOTES
[facility] Follow-up Note    Assessment & Plan        1. Coronary artery disease involving native coronary artery of native heart without angina pectoris -this is of 3 vessels.  In June 2004 she had coronary artery bypass grafting with a vein graft to an OM, vein graft to PDA, and a vein graft to diagonal, and a LIMA to the LAD.  In July 2000 for sheet occlusion of all those grafts and had stenting of the LIMA graft to the LAD, balloon angioplasty to distal OM and stenting of the proximal OM.  She is on chronic Plavix therapy and has no symptoms.  In 2011 stress test showed no ischemia.     2. Hypercholesteremia -cholesterol excellent at 91 with LDL of 32.  Will decrease atorvastatin 40 mg.     3. Essential hypertension with goal blood pressure less than 140/90 -blood pressure under good control although a little low.  On Lopressor 12 mg twice a day.     4. Paroxysmal atrial fibrillation -probably persistent, not valvular and in sinus rhythm on Rythmol.  She has a low dose Rythmol and I do not like her on this with her having coronary artery disease.  However given her age and stability this point time will not switch.  In addition she was on Coumadin and fell.  She had significant bleeding and her Coumadin was discontinued and I agree with this.  I will consider her for the watchman device.     5.  Anemia-we'll see if she qualifies for the [id number] trial.  Plan  1.  Consider for watchman device.  2.  With all these issues follow up with me in about 4 months or sooner if needed.    Subjective  CC: 88-year-old white female here for follow-up after hospitalization with her son.  She is been having falls at home where she just gives out without syncope.  This happened in October when her Coumadin was discontinued.  She comes in today feeling well still living independently without any chest discomfort, palpitations, shortness breath, PND, orthopnea or peripheral edema.    Medications  Current  Outpatient Prescriptions   Medication Sig     acetaminophen (TYLENOL) 650 MG CR tablet Take 650 mg by mouth 3 (three) times a day.     atorvastatin (LIPITOR) 80 MG tablet Take 80 mg by mouth bedtime.     clopidogrel (PLAVIX) 75 mg tablet Take 75 mg by mouth every morning.      diphenhydrAMINE-acetaminophen (TYLENOL PM)  mg Tab Take 1 tablet by mouth bedtime.     fenofibrate (TRICOR) 145 MG tablet Take 145 mg by mouth every morning.      furosemide (LASIX) 20 MG tablet Take 20 mg by mouth every morning.      glucosamine-chondroitin 500-400 mg cap Take 1 capsule by mouth 2 (two) times a day.      isosorbide mononitrate (IMDUR) 30 MG 24 hr tablet Take 30 mg by mouth every morning.      melatonin 3 mg Tab tablet Take 6 mg by mouth bedtime as needed.      metoprolol tartrate (LOPRESSOR) 25 MG tablet Take 0.5 tablets (12.5 mg total) by mouth 2 (two) times a day.     multivitamin therapeutic (THERAGRAN) tablet Take 1 tablet by mouth every morning.      nitroglycerin (NITROSTAT) 0.4 MG SL tablet Place 0.4 mg under the tongue every 5 (five) minutes as needed for chest pain.     omeprazole (PRILOSEC) 20 MG capsule Take 20 mg by mouth every evening.      oxybutynin (DITROPAN XL) 15 MG 24 hr tablet Take 15 mg by mouth every morning.      PARoxetine (PAXIL) 10 MG tablet Take 10 mg by mouth every morning.     propafenone (RYTHMOL) 225 MG tablet Take 112.5 mg by mouth 2 (two) times a day. Take half a tablet (112.5 mg) two times daily     senna-docusate (PERICOLACE) 8.6-50 mg tablet Take 2 tablets by mouth bedtime as needed for constipation.       Objective  Visit Vitals     /72 (Patient Site: Left Arm, Patient Position: Sitting, Cuff Size: Adult Large)     Pulse 64     Resp 18     Ht 5' (1.524 m)     Wt 173 lb (78.5 kg)     BMI 33.79 kg/m2       General Appearance:    Alert, cooperative, no distress, appears stated age   Head:    Normocephalic, without obvious abnormality, atraumatic   Throat:   Lips, mucosa, and  tongue normal; teeth and gums normal   Neck:   Supple, symmetrical, trachea midline, no adenopathy;        thyroid:  No enlargement/tenderness/nodules; no carotid    bruit or JVD   Back:     Symmetric, no curvature, ROM normal, no CVA tenderness   Lungs:     Clear to auscultation bilaterally, respirations unlabored   Chest wall:    No tenderness , midline sternotomy scar    Heart:    Regular rate and rhythm, S1 and S2 normal, no murmur, rub   or gallop   Abdomen:     Soft, non-tender, bowel sounds active all four quadrants,     no masses, no organomegaly   Extremities:   Normal, atraumatic, no cyanosis or edema   Pulses:   2+ and symmetric all extremities   Skin:   Skin color, texture, turgor normal, no rashes or lesions     Results    Lab Results personally reviewed   Lab Results   Component Value Date    CHOL 91 05/03/2016    CHOL 108 04/21/2015     Lab Results   Component Value Date    HDL 44 (L) 05/03/2016    HDL 50 04/21/2015     Lab Results   Component Value Date    LDLCALC 32 05/03/2016    LDLCALC 43 04/21/2015     Lab Results   Component Value Date    TRIG 74 05/03/2016    TRIG 73 04/21/2015     Lab Results   Component Value Date    WBC 6.2 10/17/2016    HGB 9.4 (L) 10/17/2016    HCT 29.2 (L) 10/17/2016     10/17/2016     Lab Results   Component Value Date    CREATININE 1.19 (H) 10/17/2016    BUN 21 10/17/2016     10/17/2016    K 3.6 10/17/2016    CO2 26 10/17/2016     Review of Systems:   General: WNL  Eyes: WNL  Ears/Nose/Throat: WNL  Lungs: WNL  Heart: Shortness of Breath with activity, Fainting  Stomach: WNL  Bladder: WNL  Muscle/Joints: Joint Pain  Skin: WNL  Nervous System: Loss of Balance  Mental Health: Confusion     Blood: Easy Bruising

## 2021-06-08 NOTE — PROGRESS NOTES
11/22/1928  [phone] (home)    +++Important patient information for CSC/Cath Lab staff : None+++    [facility] EP Cath Lab Procedure Order   Left Atrial Appendage Occlusion Device:  DYAN Occlusion Device Implant    Diagnosis:  AF  Anticipated Case Duration:  Standard  Scheduling Needs/Timeframe:  Next Available    MD Preference: Dr Shiv FINN Assist:  No Specific MD:  N/A    Current Device: None None  Device Company/Device Rep Needed for Procedure: None    Pre-Procedural Testing needed: FILOMENA  ICE Needed:  Yes  Implanting device company: [employer], Pt does not want to do research  Non-Invasive Cardiologist: Whole Case  Anesthesia:  General-Whole Case  Research Protocol:  No    [facility] EP Cath Lab Prep   Ordering Provider: Dr Chaney  Ordering Date: 2/2/2017  Orders Status: Intial order placed and Order set placed  EP NC Contact: Bebe Welch RN    H&P:  EP NP to complete within 30 days prior to scheduled implant  PCP: Jewel Sheikh MD, [phone]    Pre-op Labs: Done within 7 days    Medical Records Pertinent for Procedure:  Echo 10-7-16 EF 60% and EKG 10-7-16 SR w/1AVB @50,   9-14-16 SR w/1AVB @62,  12-30-12 Afib @126    Patient Education:    Teach with Patient: Teach with pt completed same day as pre-op H&P-see additional clinic note    Risks Reviewed:   Specific DYAN occlusion (WATCHMAN) risks (< 2%):      - device embolization (device moves from intended location)      - air embolism (air bubbles in the blood stream)      - heart perforation (hole in the heart)      - pericardial effusion (fluid collection in the sack around the heart)      - device thrombosis (clot on the device)      - atrial septal defect (hole between upper chambers of the heart)      - stroke or TIA      - death    Risks associated with heart catheterization:      - groin bleeding/swelling      - AV fistula (hole between artery and vein)      - blood loss      - clot in the vein    FILOEMNA risks:      - throat pain, esophageal  bleeding/trauma      Consent: Will be obtained in [facility] day of procedure    Pre-Procedure Instructions that were Reviewed with Patient:  NPO after midnight, Notified patient of time and date of procedure by CV , Transportation arrangements needed s/p procedure, Post-procedure follow up process, Sedation plan/orders and Pre-procedure letter was sent to pt by CV     Pre-Procedure Medication Instructions:  Instructions given to pt regarding anticoagulants: Pt is not on an anticoagulant- Will discuss initiation prior to procedure with SWA/Is taking Plavix  Instructions given to pt regarding antiarrhythmic medication: Beta Blocker; Pt instructed to continue medication prior to procedure  Instructions for medication, other than anticoagulants/antiarrhythmics listed above, given to pt: to take all morning medications with small sips of water, with the exception of OTC supplements and MVI      No Known Allergies    Current Outpatient Prescriptions:      acetaminophen (TYLENOL) 650 MG CR tablet, Take 650 mg by mouth 3 (three) times a day., Disp: , Rfl:      atorvastatin (LIPITOR) 80 MG tablet, Take 80 mg by mouth bedtime., Disp: , Rfl:      clopidogrel (PLAVIX) 75 mg tablet, Take 75 mg by mouth every morning. , Disp: , Rfl:      diphenhydrAMINE-acetaminophen (TYLENOL PM)  mg Tab, Take 1 tablet by mouth bedtime., Disp: , Rfl:      fenofibrate (TRICOR) 145 MG tablet, Take 145 mg by mouth every morning. , Disp: , Rfl:      furosemide (LASIX) 20 MG tablet, Take 20 mg by mouth every morning. , Disp: , Rfl:      glucosamine-chondroitin 500-400 mg cap, Take 1 capsule by mouth 2 (two) times a day. , Disp: , Rfl:      isosorbide mononitrate (IMDUR) 30 MG 24 hr tablet, Take 30 mg by mouth every morning. , Disp: , Rfl:      melatonin 3 mg Tab tablet, Take 6 mg by mouth bedtime as needed. , Disp: , Rfl:      metoprolol tartrate (LOPRESSOR) 25 MG tablet, Take 0.5 tablets (12.5 mg total) by mouth 2 (two) times a  day., Disp: 60 tablet, Rfl: 0     multivitamin therapeutic (THERAGRAN) tablet, Take 1 tablet by mouth every morning. , Disp: , Rfl:      nitroglycerin (NITROSTAT) 0.4 MG SL tablet, Place 0.4 mg under the tongue every 5 (five) minutes as needed for chest pain., Disp: , Rfl:      omeprazole (PRILOSEC) 20 MG capsule, Take 20 mg by mouth every evening. , Disp: , Rfl:      oxybutynin (DITROPAN XL) 15 MG 24 hr tablet, Take 15 mg by mouth every morning. , Disp: , Rfl:      PARoxetine (PAXIL) 10 MG tablet, Take 10 mg by mouth every morning., Disp: , Rfl:      propafenone (RYTHMOL) 225 MG tablet, Take 112.5 mg by mouth 2 (two) times a day. Take half a tablet (112.5 mg) two times daily, Disp: , Rfl:      senna-docusate (PERICOLACE) 8.6-50 mg tablet, Take 2 tablets by mouth bedtime as needed for constipation., Disp: , Rfl: 0

## 2021-06-09 ENCOUNTER — RECORDS - HEALTHEAST (OUTPATIENT)
Dept: ADMINISTRATIVE | Facility: CLINIC | Age: 86
End: 2021-06-09

## 2021-06-10 NOTE — PROGRESS NOTES
[facility]      Facility:    [facility] [[id number]]  Code Status: UNKNOWN      Chief Complaint/Reason for Visit:  Chief Complaint   Patient presents with     H & P     Fever of unknown source, somnolence, encephalopathy, cognitive disorder, anxiety and depression, insomnia, history of TIAs.       HPI:   Grace is a 88 y.o. female who was recently admitted to the hospital on 4/28/2017 being brought into the emergency department for fever and confusion.  Patient was worked up very thoroughly and found no source of fever and she also underwent lumbar puncture as well as blood cultures.  She was empirically started on antibiotics and then they were stopped secondary to her fever resolved and she did return to her baseline functioning.  She also had some confusion and a neurology consult was done.  A psychiatry consult was also done and there was a question of some mild baseline cognitive impairment versus possible dementia.  Psychiatry recommend starting Aricept and she and her family agreed.  She still was very weak so they decided to discharge her to the transitional care at [facility] which she arrived in stable condition.    Patient today claims she feels fine she does have occasional leg pain secondary to vascular issues however she takes Tylenol and it does help.  She is on this on a as needed basis and I did discuss with her in detail possibly scheduling it and she was okay with that at this time.  He seems to be doing well and denies any other new issues at this time.    Past Medical History:  Past Medical History:   Diagnosis Date     Atrial fibrillation      CAD (coronary artery disease)      Dyslipidemia      Fall 10/06/2016     Hyperlipidemia      Hypertension      Laceration of head 10/06/2016     Osteoarthritis      Peripheral vascular disease      Spinal stenosis      Transient ischemic attack            Surgical History:  Past Surgical History:   Procedure  Laterality Date     APPENDECTOMY       EYE SURGERY      L cataract     RI CABG, VEIN, SINGLE      Description: CABG (CABG);  Recorded: 01/12/2012;  Comments: Also subsequent stent placement after that with failure of her grafts     RI VEIN BYPASS GRAFT,FEM-POP      Description: Bypass Graft Using Vein: Femoral-popliteal;  Recorded: 01/12/2012;  Comments: gore-jenny was used not vein     SALIVARY GLAND SURGERY         Family History:   Family History   Problem Relation Age of Onset     Cancer Mother      Stroke Father      COPD Sister      No Medical Problems Sister        Social History:    Social History     Social History     Marital status:      Spouse name: N/A     Number of children: N/A     Years of education: N/A     Social History Main Topics     Smoking status: Former Smoker     Smokeless tobacco: Never Used      Comment: quit 30 yeqrs ago     Alcohol use No     Drug use: No     Sexual activity: Not Asked     Other Topics Concern     None     Social History Narrative    The patient is retired.          Review of Systems   Constitutional: Negative for activity change, chills and fever.   HENT: Negative for hearing loss.    Eyes: Negative for visual disturbance.   Respiratory: Negative for apnea, chest tightness and shortness of breath.    Cardiovascular: Negative for chest pain and palpitations.   Gastrointestinal: Negative for abdominal pain, constipation, nausea and vomiting.   Endocrine: Negative for polydipsia, polyphagia and polyuria.   Genitourinary: Negative for decreased urine volume and urgency.   Musculoskeletal: Negative for neck pain and neck stiffness.   Skin: Negative for rash.   Hematological: Does not bruise/bleed easily.   Psychiatric/Behavioral: Negative for agitation and behavioral problems.       Vitals:    05/04/17 0820   BP: 139/76   Pulse: 91   Resp: 18   Temp: 98.3  F (36.8  C)   SpO2: 95%       Physical Exam   Constitutional: She appears well-developed and well-nourished. No  distress.   HENT:   Head: Normocephalic and atraumatic.   Nose: Nose normal.   Mouth/Throat: Oropharynx is clear and moist. No oropharyngeal exudate.   Eyes: Conjunctivae are normal. Right eye exhibits no discharge. Left eye exhibits no discharge. No scleral icterus.   Neck: No thyromegaly present.   Cardiovascular:   Patient's heart rate is irregularly irregular with adequate rate control.   Pulmonary/Chest: Effort normal. No respiratory distress. She has no wheezes. She has no rales.   Abdominal: Soft. Bowel sounds are normal. She exhibits distension. She exhibits no mass. There is no tenderness.   Musculoskeletal: Normal range of motion. She exhibits edema. She exhibits no tenderness.   Lymphadenopathy:     She has no cervical adenopathy.   Neurological: She is alert. She displays normal reflexes. No cranial nerve deficit. She exhibits normal muscle tone. Coordination normal.   Skin: Skin is warm and dry. She is not diaphoretic.   Psychiatric: She has a normal mood and affect. Her behavior is normal.       Medication List:  Current Outpatient Prescriptions   Medication Sig     atorvastatin (LIPITOR) 80 MG tablet Take 40 mg by mouth at bedtime. (Take half a tablet (40mg) at bedtime     clopidogrel (PLAVIX) 75 mg tablet Take 75 mg by mouth every morning.      fenofibrate (TRICOR) 145 MG tablet Take 145 mg by mouth every morning.      furosemide (LASIX) 20 MG tablet Take 20 mg by mouth every morning.      glucosamine-chondroitin 500-400 mg cap Take 1 capsule by mouth 2 (two) times a day.      isosorbide mononitrate (IMDUR) 30 MG 24 hr tablet Take 30 mg by mouth every morning.      melatonin 3 mg Tab tablet Take 3 mg by mouth at bedtime.      metoprolol tartrate (LOPRESSOR) 25 MG tablet Take 0.5 tablets (12.5 mg total) by mouth 2 (two) times a day.     multivitamin therapeutic (THERAGRAN) tablet Take 1 tablet by mouth every morning.      nitroglycerin (NITROSTAT) 0.4 MG SL tablet Place 0.4 mg under the tongue every 5  (five) minutes as needed for chest pain.     omeprazole (PRILOSEC) 20 MG capsule Take 20 mg by mouth Daily before breakfast.      oxybutynin (DITROPAN XL) 15 MG 24 hr tablet Take 15 mg by mouth every morning.      PARoxetine (PAXIL) 10 MG tablet Take 10 mg by mouth every morning.     propafenone (RYTHMOL) 225 MG tablet Take 112.5 mg by mouth 2 (two) times a day. Take half a tablet (112.5 mg) two times daily     traZODone (DESYREL) 50 MG tablet Take 0.5 tablets (25 mg total) by mouth at bedtime.       Labs: Hospital labs are as follows; magnesium 2.0, GFR is 58, basic metabolic profile was within normal limits.  Platelets 171,000, B12 was 1093, potassium 3.6, CBC was basically unremarkable.      Assessment:    ICD-10-CM    1. Fever of unknown origin R50.9    2. Encephalopathy G93.40    3. Anxiety associated with depression F41.8    4. Cognitive disorder F09    5. Insomnia G47.00        Plan: Plan at this time is to continue to monitor above medical problems and I will change her Tylenol to scheduled.  We will do 500 mg every 6 hours and continue to monitor above medical problems.  Given the fact she seems to be doing okay today and has no new complaints we will continue with physical and occupational therapy and we will have staff evaluate for cognitive issues.  As I look through the labs I do not see anything that I need to redraw at this time and no other changes to care plan at this time.        Electronically signed by: Hong Cardenas,

## 2021-06-12 NOTE — PROGRESS NOTES
[facility] Follow-up Note    Assessment & Plan        1. Coronary artery disease involving native coronary artery of native heart without angina pectoris  -this is of 3 vessels.  In June 2004 she had coronary artery bypass grafting with a vein graft to an OM, vein graft to PDA, and a vein graft to diagonal, and a LIMA to the LAD.  In July 2004 she had occlusion of all those grafts and had stenting of the LIMA graft to the LAD, balloon angioplasty to distal OM and stenting of the proximal OM.  She is on chronic Plavix therapy and has no symptoms.  In 2011 stress test showed no ischemia.    Given that this was 13 years ago I discussed pursuing stress testing and she was very hesitant to perform stress testing and states she is getting along well and does not want to risk upsetting the status quo.   2. Paroxysmal atrial fibrillation -asymptomatic not valvular and was controlled on propafenone but I lower the dose since I do not like and coronary artery disease and she wanted to keep it going.  ECG today does show atrial fibrillation so I will discontinue the propafenone, as a lengthy conversation she has acquiesced to going back on Eliquis 5 mg twice a day.  She does not want Coumadin secondary to bleeding in the past and declines watchman device.  I will have her wear a Holter monitor to see if she is having paroxysms and if so consider sotalol.   3. S/P CABG x 4 -as above LIMA to the LAD, vein graft to PDA, vein graft to OM, and vein graft to diagonal all of which are occluded immediately within 6 months of surgery.   4. Transient cerebral ischemia, unspecified type -this increases her CHADS 2 VASC score to at least 4 or 5 and thus the reason for the Eliquis as above.   5. Ischemic cardiomyopathy -ejection fraction well-preserved on echo in April of this year.   6. Essential hypertension with goal blood pressure less than 140/90 -under good control currently.   7. Hypercholesteremia -plus all  excellent 93 with an LDL of 33.     Plan  1.  Start Eliquis 5 mg mouth twice a day.  2.  Holter monitor and if going in and out of atrial fibrillation using antiarrhythmic therapy.  3.  Follow-up with me in about 3 months or sooner if needed.  4.  Discontinue propafenone.    Subjective  CC: 88-year-old white female being seen in follow-up today.  Since I seen her she had a fall.  Otherwise there is no syncope, dizziness, chest discomfort, palpitations, PND, orthopnea or peripheral edema.  She does admit to some shortness of breath and heavy activity.    Medications  Current Outpatient Prescriptions   Medication Sig Note     atorvastatin (LIPITOR) 80 MG tablet Take 40 mg by mouth at bedtime. (Take half a tablet (40mg) at bedtime      clopidogrel (PLAVIX) 75 mg tablet Take 75 mg by mouth every morning.       fenofibrate (TRICOR) 145 MG tablet Take 145 mg by mouth every morning.       furosemide (LASIX) 20 MG tablet Take 20 mg by mouth every morning.       glucosamine-chondroitin 500-400 mg cap Take 1 capsule by mouth 2 (two) times a day.       isosorbide mononitrate (IMDUR) 30 MG 24 hr tablet Take 30 mg by mouth every morning.       melatonin 3 mg Tab tablet Take 3 mg by mouth at bedtime.       metoprolol tartrate (LOPRESSOR) 25 MG tablet Take 0.5 tablets (12.5 mg total) by mouth 2 (two) times a day.      multivitamin therapeutic (THERAGRAN) tablet Take 1 tablet by mouth every morning.       nitroglycerin (NITROSTAT) 0.4 MG SL tablet Place 0.4 mg under the tongue every 5 (five) minutes as needed for chest pain.      omeprazole (PRILOSEC) 20 MG capsule Take 20 mg by mouth Daily before breakfast.       oxybutynin (DITROPAN XL) 15 MG 24 hr tablet Take 15 mg by mouth every morning.       oxyCODONE-acetaminophen (PERCOCET) 5-325 mg per tablet TAKE 1 TAB(S) BY MOUTH EVERY 6 HOURS SPARINGLY FOR LEG PAIN. 9/6/2017: Received from: External Pharmacy     PARoxetine (PAXIL) 10 MG tablet Take 10 mg by mouth every morning.       "propafenone (RYTHMOL) 225 MG tablet Take 112.5 mg by mouth 2 (two) times a day. Take half a tablet (112.5 mg) two times daily      traZODone (DESYREL) 50 MG tablet TAKE 0.5 TABLET(S) BY MOUTH ONCE A DAY ORALLY 30 9/6/2017: Received from: External Pharmacy       Objective  /82  Pulse 80  Resp 20  Ht 5' 3\" (1.6 m)  Wt 168 lb 8 oz (76.4 kg)  BMI 29.85 kg/m2    General Appearance:    Alert, cooperative, no distress, appears stated age   Head:    Normocephalic, without obvious abnormality, atraumatic   Throat:   Lips, mucosa, and tongue normal; teeth and gums normal   Neck:   Supple, symmetrical, trachea midline, no adenopathy;        thyroid:  No enlargement/tenderness/nodules; no carotid    bruit or JVD   Back:     Symmetric, no curvature, ROM normal, no CVA tenderness   Lungs:     Clear to auscultation bilaterally, respirations unlabored   Chest wall:    No tenderness, midline sternotomy scar   Heart:   Irregularly irregular, S1 and S2 normal, no murmur, rub   or gallop   Abdomen:     Soft, non-tender, bowel sounds active all four quadrants,     no masses, no organomegaly   Extremities:   Normal, atraumatic, no cyanosis or edema   Pulses:   2+ and symmetric all extremities   Skin:   Skin color, texture, turgor normal, no rashes or lesions     Results    Lab Results personally reviewed   Lab Results   Component Value Date    CHOL 93 01/27/2017    CHOL 91 05/03/2016     Lab Results   Component Value Date    HDL 47 (L) 01/27/2017    HDL 44 (L) 05/03/2016     Lab Results   Component Value Date    LDLCALC 33 01/27/2017    LDLCALC 32 05/03/2016     Lab Results   Component Value Date    TRIG 66 01/27/2017    TRIG 74 05/03/2016     Lab Results   Component Value Date    WBC 4.7 04/30/2017    HGB 11.7 (L) 04/30/2017    HCT 34.7 (L) 04/30/2017     05/02/2017     Lab Results   Component Value Date    CREATININE 0.92 05/02/2017    BUN 16 05/02/2017     05/02/2017    K 3.5 05/02/2017    CO2 26 05/02/2017 "     Reviewed electrocardiogram atrial fibrillation, leftward axis, old anterior Q-wave MI type pattern.    Review of Systems:   General: WNL  Eyes: WNL  Ears/Nose/Throat: Hearing Loss  Lungs: Cough, Shortness of Breath  Heart: Shortness of Breath with activity, Leg Swelling  Stomach: WNL  Bladder: WNL  Muscle/Joints: Joint Pain, Muscle Weakness, Muscle Pain  Skin: Poor Wound Healing  Nervous System: Daytime Sleepiness, Loss of Balance  Mental Health: Confusion     Blood: Easy Bruising, Easy Bleeding

## 2021-06-15 PROBLEM — R65.10 SIRS (SYSTEMIC INFLAMMATORY RESPONSE SYNDROME) (H): Status: ACTIVE | Noted: 2017-04-28

## 2021-06-15 PROBLEM — R50.9 FEVER: Status: ACTIVE | Noted: 2017-04-28

## 2021-06-15 PROBLEM — R40.0 SOMNOLENCE: Status: ACTIVE | Noted: 2017-04-28

## 2021-06-16 PROBLEM — N18.30 CKD (CHRONIC KIDNEY DISEASE) STAGE 3, GFR 30-59 ML/MIN (H): Status: ACTIVE | Noted: 2019-01-01

## 2021-06-16 PROBLEM — E44.0 MODERATE MALNUTRITION (H): Status: ACTIVE | Noted: 2019-01-01

## 2021-06-16 PROBLEM — R60.0 LOCALIZED EDEMA: Status: ACTIVE | Noted: 2018-01-01

## 2021-06-16 PROBLEM — M62.81 GENERALIZED MUSCLE WEAKNESS: Status: ACTIVE | Noted: 2019-01-01

## 2021-06-16 PROBLEM — R06.02 SOB (SHORTNESS OF BREATH): Status: ACTIVE | Noted: 2019-01-01

## 2021-06-16 PROBLEM — M54.50 ACUTE RIGHT-SIDED LOW BACK PAIN WITHOUT SCIATICA: Status: ACTIVE | Noted: 2019-01-01

## 2021-06-16 PROBLEM — N18.30 ACUTE RENAL FAILURE SUPERIMPOSED ON STAGE 3 CHRONIC KIDNEY DISEASE (H): Status: ACTIVE | Noted: 2019-01-01

## 2021-06-16 PROBLEM — G25.5 CHOREA: Status: ACTIVE | Noted: 2019-01-01

## 2021-06-16 PROBLEM — J96.00 ARF (ACUTE RESPIRATORY FAILURE) (H): Status: ACTIVE | Noted: 2018-01-01

## 2021-06-16 PROBLEM — Z95.1 S/P CABG X 3: Status: ACTIVE | Noted: 2017-09-06

## 2021-06-16 PROBLEM — Z51.5 ENCOUNTER FOR PALLIATIVE CARE: Status: ACTIVE | Noted: 2019-01-01

## 2021-06-16 PROBLEM — I50.30 HEART FAILURE WITH PRESERVED EJECTION FRACTION (H): Status: ACTIVE | Noted: 2018-01-01

## 2021-06-16 PROBLEM — N17.9 ACUTE RENAL FAILURE SUPERIMPOSED ON STAGE 3 CHRONIC KIDNEY DISEASE (H): Status: ACTIVE | Noted: 2019-01-01

## 2021-06-16 PROBLEM — I95.9 HYPOTENSION: Status: ACTIVE | Noted: 2019-01-01

## 2021-06-16 PROBLEM — I50.33 ACUTE ON CHRONIC DIASTOLIC CONGESTIVE HEART FAILURE (H): Status: ACTIVE | Noted: 2019-01-01

## 2021-06-16 PROBLEM — Z71.89 GOALS OF CARE, COUNSELING/DISCUSSION: Status: ACTIVE | Noted: 2019-01-01

## 2021-06-19 NOTE — PROGRESS NOTES
Please inform patient I reviewed blood tests and kidney function stable from primary office.  Vincent Barba

## 2021-06-19 NOTE — PROGRESS NOTES
[facility] Follow-up Note    Assessment & Plan        1. Coronary artery disease involving native coronary artery of native heart without angina pectoris -this is of 3 vessels.  In June 2004 she had coronary artery bypass grafting with a vein graft to an OM, vein graft to PDA, and a vein graft to diagonal, and a LIMA to the LAD.  In July 2004 she had occlusion of all those grafts and had stenting of the LIMA graft to the LAD, balloon angioplasty to distal OM and stenting of the proximal OM.  She is on chronic Plavix therapy and has no symptoms.  In 2011 stress test showed no ischemia.  Given that this was 13 years ago I discussed pursuing stress testing and she was very hesitant to perform stress testing and states she is getting along well and does not want to risk upsetting the status quo.    2. S/P CABG x 3 -as above LIMA to the LAD, vein graft to PDA, vein graft to OM, and vein graft to diagonal all of which are occluded immediately within 6 months of surgery.  As above chronic Eliquis and Plavix therapy and lipid therapy.   3. Hypercholesteremia -cholesterol with an LDL of 37 and total of 105 from December 2017.  She is on TriCor maximum dose of atorvastatin maximum dose.  Will check liver function studies today given her age to make sure they are not hurting her.  May need to watch for rhabdomyolysis.   4. Hypertension -under good control currently.   5. Ischemic cardiomyopathy -ejection fraction well-preserved at 63%.   6. Paroxysmal atrial fibrillation (H)  -asymptomatic not valvular and was controlled on propafenone but I lowered the dose since I do not like with coronary artery disease and she wanted to keep it going. I did discontinue the propafenone, as a lengthy conversation she has acquiesced to going back on Eliquis 5 mg twice a day.  She does not want Coumadin secondary to bleeding in the past and declines Watchman device.     7. CHF (congestive heart failure) (H) -she does have  increased peripheral edema and I will check BMP given what sounds like some orthopnea.  If elevated will need to discuss increasing dose of furosemide.     Plan  1.  Check BNP and if elevated increase furosemide.  2.  Check renal profile and if creatinine increased lower dose of Eliquis.  3.  Check liver function studies and if elevated back off on dose of atorvastatin.  4.  Follow-up with me in about 6 months or sooner if needed given all these issues.    Subjective  CC: 89 soon-to-be 89-year-old white female here for yearly follow-up today.  Since I seen her she took her daughter and son-in-law on an [employer] cruise.  The majority of her children go to [place] for the winter and she does not.  She lives at home independently, kids looking on her frequently.  There is no chest discomfort, palpitations, shortness breath, PND, orthopnea or peripheral edema.    Medications  Current Outpatient Prescriptions   Medication Sig     apixaban (ELIQUIS) 5 mg Tab tablet Take 1 tablet (5 mg total) by mouth 2 (two) times a day.     atorvastatin (LIPITOR) 80 MG tablet Take 40 mg by mouth at bedtime. (Take half a tablet (40mg) at bedtime     clopidogrel (PLAVIX) 75 mg tablet Take 75 mg by mouth every morning.      fenofibrate (TRICOR) 145 MG tablet Take 145 mg by mouth every morning.      furosemide (LASIX) 20 MG tablet Take 20 mg by mouth every morning.      glucosamine-chondroitin 500-400 mg cap Take 1 capsule by mouth 2 (two) times a day.      isosorbide mononitrate (IMDUR) 30 MG 24 hr tablet Take 30 mg by mouth every morning.      Lactobacillus rhamnosus GG (CULTURELLE) 10-15 Billion cell capsule Take 1 capsule by mouth daily.     melatonin 3 mg Tab tablet Take 3 mg by mouth at bedtime.      metoprolol tartrate (LOPRESSOR) 25 MG tablet Take 0.5 tablets (12.5 mg total) by mouth 2 (two) times a day.     multivitamin therapeutic (THERAGRAN) tablet Take 1 tablet by mouth every morning.      nitroglycerin (NITROSTAT) 0.4 MG SL  "tablet Place 0.4 mg under the tongue every 5 (five) minutes as needed for chest pain.     oxybutynin (DITROPAN XL) 15 MG 24 hr tablet Take 15 mg by mouth every morning.      oxyCODONE-acetaminophen (PERCOCET) 5-325 mg per tablet TAKE 1 TAB(S) BY MOUTH EVERY 6 HOURS SPARINGLY FOR LEG PAIN.     pantoprazole (PROTONIX) 20 MG tablet Take 20 mg by mouth daily.     PARoxetine (PAXIL) 10 MG tablet Take 5 mg by mouth every morning.      traZODone (DESYREL) 50 MG tablet TAKE 0.5 TABLET(S) BY MOUTH ONCE A DAY ORALLY 30       Objective  /66 (Patient Site: Right Arm, Patient Position: Sitting, Cuff Size: Adult Regular)  Pulse 68  Resp 16  Ht 5' 3\" (1.6 m)  Wt 175 lb (79.4 kg)  BMI 31 kg/m2    General Appearance:    Alert, cooperative, no distress, appears stated age   Head:    Normocephalic, without obvious abnormality, atraumatic   Throat:   Lips, mucosa, and tongue normal; teeth and gums normal   Neck:   Supple, symmetrical, trachea midline, no adenopathy;        thyroid:  No enlargement/tenderness/nodules; no carotid    bruit or JVD   Back:     Symmetric, no curvature, ROM normal, no CVA tenderness   Lungs:     Clear to auscultation bilaterally, respirations unlabored   Chest wall:    No tenderness, midline sternotomy scar   Heart:    Regular rate and rhythm, S1 and S2 normal, no murmur, rub   or gallop   Abdomen:     Soft, non-tender, bowel sounds active all four quadrants,     no masses, no organomegaly   Extremities:   Normal, atraumatic, no cyanosis, 1-2/4 bipedal pitting edema   Pulses:   2+ and symmetric all extremities   Skin:   Skin color, texture, turgor normal, no rashes or lesions     Results    Lab Results personally reviewed   Lab Results   Component Value Date    CHOL 105 12/12/2017    CHOL 93 01/27/2017     Lab Results   Component Value Date    HDL 53 12/12/2017    HDL 47 (L) 01/27/2017     Lab Results   Component Value Date    LDLCALC 37 12/12/2017    LDLCALC 33 01/27/2017     Lab Results "   Component Value Date    TRIG 77 12/12/2017    TRIG 66 01/27/2017     Lab Results   Component Value Date    WBC 4.7 04/30/2017    HGB 11.7 (L) 04/30/2017    HCT 34.7 (L) 04/30/2017     05/02/2017     Lab Results   Component Value Date    CREATININE 1.13 (H) 06/12/2018    BUN 24 06/12/2018     06/12/2018    K 3.9 06/12/2018    CO2 25 06/12/2018     Review of Systems:   General: WNL  Eyes: Visual Distubance  Ears/Nose/Throat: Hearing Loss  Lungs: Cough, Shortness of Breath  Heart: Arm Pain, Leg Swelling  Stomach: WNL  Bladder: WNL  Muscle/Joints: Joint Pain, Muscle Weakness, Muscle Pain  Skin: WNL  Nervous System: Daytime Sleepiness, Loss of Balance  Mental Health: Confusion     Blood: Easy Bruising

## 2021-06-23 NOTE — PROGRESS NOTES
[facility] Clinic Follow-up Note    Assessment & Plan        1. Coronary artery disease involving native coronary artery of native heart without angina pectoris -this is of 3 vessels.  In June 2004 she had coronary artery bypass grafting with a vein graft to an OM, vein graft to PDA, and a vein graft to diagonal, and a LIMA to the LAD.  In July 2004 she had occlusion of all those grafts and had stenting of the LIMA graft to the LAD, balloon angioplasty to distal OM and stenting of the proximal OM.  She is on chronic Plavix therapy and had no symptoms.  In 2011 stress test showed no ischemia.  Given that this was 14 years ago and she is having heart failure now with diminished ejection fraction I discussed pursuing stress testing and she was very hesitant to perform stress testing.  In addition, she now has an anemia and her hemoglobin continues to diminish I will want to stop the Plavix and go to aspirin.    2. S/P CABG x 3 as above LIMA to the LAD, vein graft to PDA, vein graft to OM, and vein graft to diagonal all of which are occluded immediately within 6 months of surgery.  As above chronic Eliquis and Plavix therapy and lipid therapy.if she has a significantly diminished hemoglobin I will stop Plavix and go to baby aspirin.   3. Acute congestive heart failure, unspecified heart failure type (H) -this prompted admission to [facility] in November, follow-up with heart failure clinic thereafter.  Ejection fraction of diminished about 55%.  I feel she is in heart failure again and we will check BNP and BMP today.  I will change furosemide over Lasix 1 mg twice a day.  I will ask that she follow with my heart failure nurse practitioner in a week.   4. Ischemic cardiomyopathy -initial ejection fraction 63% but recent echo suggested 50-55%.  Once she stabilizes I feel like I need to pursue pharmacological nuclear stress test to look for ongoing ischemia and possible need for intervention.   5.  Hypercholesteremia-LDL 37 which is excellent from December 2017.   6. Hypertension -under good control currently.   7. Tricuspid valve insufficiency, unspecified etiology-moderate to severe based on recent echo with peripheral edema.  Borderline pulmonary hypertension.   8. Paroxysmal atrial fibrillation (H)  -asymptomatic not valvular and was controlled on propafenone but I lowered the dose since I do not like with coronary artery disease and she wanted to keep it going. I did discontinue the propafenone, as a lengthy conversation she has acquiesced to going back on Eliquis 5 mg twice a day.  She does not want Coumadin secondary to bleeding in the past and declines Watchman device.   As above if significant anemia found today we will need to discontinue Plavix and continue Eliquis.    9.  Anemia-as above hemoglobin has been diminishing and I will check a hemoglobin today.  10.  Chronic kidney disease-creatinine has been increasing and will switch from furosemide over to Bumex today.    Plan  1.  Check renal profile today to trend creatinine.  2.  Check hemoglobin today to trend.  3.  Change furosemide over to Bumex 1 mg twice a day.  4.  Follow-up with heart failure nurse practitioner in 1 week and me in 3 months.  5.  If hemoglobin further diminished discontinue Plavix and go to baby aspirin 81 mg a day.  6.  If symptoms persist will need to consider Watchman device as well as pharmacological nuclear stress test.    Subjective  CC: 89-year-old white female being seen in follow-up today.  She is here with a daughter, and I have just recently taking care of 1 of her other daughters in the hospital.  She is now living in [facility] assisted living.  She was hospitalized for heart failure.  She has a progressively increased shortness of breath over the last several weeks with what sounds like PND and orthopnea with increased bipedal edema.  There is no obvious chest discomfort, palpitations, syncope or  dizziness.    Medications  Current Outpatient Medications   Medication Sig     acetaminophen (TYLENOL) 500 MG tablet Take 500 mg by mouth every 4 (four) hours as needed for pain.     apixaban (ELIQUIS) 5 mg Tab tablet Take 1 tablet (5 mg total) by mouth 2 (two) times a day.     atorvastatin (LIPITOR) 80 MG tablet Take 80 mg by mouth at bedtime. (Take half a tablet (40mg) at bedtime           clopidogrel (PLAVIX) 75 mg tablet Take 75 mg by mouth every morning.      fenofibrate (TRICOR) 145 MG tablet Take 145 mg by mouth every morning.      furosemide (LASIX) 20 MG tablet Take 3 tablets (60 mg total) by mouth daily.     glucosamine-chondroitin 500-400 mg cap Take 2 capsules by mouth daily .           isosorbide mononitrate (IMDUR) 30 MG 24 hr tablet Take 30 mg by mouth every morning.      melatonin 3 mg Tab tablet Take 3 mg by mouth at bedtime.      metoprolol tartrate (LOPRESSOR) 25 MG tablet Take 0.5 tablets (12.5 mg total) by mouth 2 (two) times a day.     multivitamin therapeutic (THERAGRAN) tablet Take 1 tablet by mouth every morning.      nitroglycerin (NITROSTAT) 0.4 MG SL tablet Place 0.4 mg under the tongue every 5 (five) minutes as needed for chest pain.     oxybutynin (DITROPAN XL) 15 MG 24 hr tablet Take 15 mg by mouth every morning.      pantoprazole (PROTONIX) 20 MG tablet Take 20 mg by mouth every evening .           PARoxetine (PAXIL) 10 MG tablet Take 5 mg by mouth every morning.      traZODone (DESYREL) 50 MG tablet TAKE 0.5 TABLET(S) BY MOUTH AT BEDTIME     traZODone (DESYREL) 50 MG tablet Take 25 mg by mouth at bedtime as needed for sleep (additional prn dose on top of regularly scheduled trazodone dose) .           guaiFENesin ER (MUCINEX) 600 mg 12 hr tablet Take 600 mg by mouth 2 (two) times a day as needed for congestion.       Objective  /62 (Patient Site: Left Arm, Patient Position: Sitting, Cuff Size: Adult Regular)   Pulse 64   Resp 16   Ht 5' (1.524 m)   Wt 171 lb (77.6 kg)    SpO2 94%   BMI 33.40 kg/m      General Appearance:    Alert, cooperative, no distress, appears stated age   Head:    Normocephalic, without obvious abnormality, atraumatic   Throat:   Lips, mucosa, and tongue normal; teeth and gums normal   Neck:   Supple, symmetrical, trachea midline, no adenopathy;        thyroid:  No enlargement/tenderness/nodules; no carotid    bruit, 1-2 cm at 30 degrees JVD   Back:     Symmetric, no curvature, ROM normal, no CVA tenderness   Lungs:    Decreased breath sounds bases bilaterally, respirations unlabored   Chest wall:    No tenderness, midline sternotomy scar   Heart:    Regular rate and rhythm, S1 and S2 normal, no murmur, rub   or gallop   Abdomen:     Soft, non-tender, bowel sounds active all four quadrants,     no masses, no organomegaly   Extremities:   Normal, atraumatic, no cyanosis or edema   Pulses:   2+ and symmetric all extremities   Skin:   Skin color, texture, turgor normal, no rashes or lesions     Results    Lab Results personally reviewed   Lab Results   Component Value Date    CHOL 105 12/12/2017    CHOL 93 01/27/2017     Lab Results   Component Value Date    HDL 53 12/12/2017    HDL 47 (L) 01/27/2017     Lab Results   Component Value Date    LDLCALC 37 12/12/2017    LDLCALC 33 01/27/2017     Lab Results   Component Value Date    TRIG 77 12/12/2017    TRIG 66 01/27/2017     Lab Results   Component Value Date    WBC 5.1 11/28/2018    HGB 10.3 (L) 01/23/2019    HCT 38.0 11/28/2018     11/28/2018     Lab Results   Component Value Date    CREATININE 1.52 (H) 12/24/2018    BUN 36 (H) 12/24/2018     12/24/2018    K 3.5 12/24/2018    CO2 30 12/24/2018     Review of Systems:   General: WNL  Eyes: WNL  Ears/Nose/Throat: WNL  Lungs: Cough, Shortness of Breath, Wheezing  Heart: Chest Pain, Shortness of Breath with activity, Leg Swelling  Stomach: Diarrhea, Heartburn  Bladder: WNL  Muscle/Joints: Joint Pain, Muscle Weakness  Skin: Poor Wound Healing  Nervous  System: Daytime Sleepiness, Dizziness  Mental Health: Confusion, Depression, Anxiety     Blood: Easy Bleeding, Easy Bruising

## 2021-06-23 NOTE — TELEPHONE ENCOUNTER
----- Message from Ginna Barba MD sent at 2/11/2019  4:45 PM CST -----  Please contact patient or her daughter to let her know BNP significantly elevated, retaining water again and hopefully the Bumex will help.  I let them know that kidney function is better, and hemoglobin is better, so I do not want to change any other medications other than the Lasix to Bumex 1 mg twice a day.  LF

## 2021-06-23 NOTE — TELEPHONE ENCOUNTER
Called Mireille Moore again this morning. The RN did open the envelope from Grace's appt yesterday and sees the bumex change with instruction to follow up 2/20/19 with DCB in HF clinic. She will send in the new prescription for Bumex stat now and monitor for any signs of worsening HF. -[facility] [place]

## 2021-06-23 NOTE — PATIENT INSTRUCTIONS - HE
Ms Grace Robb,  I enjoyed visiting with you again today.  I am concerned with the shortness of breath.  Per our conversation start BUMEX 1 mg twice a day in place of FUROSEMIDE.  I will plan on seeing you in a few weeks but I would want you to see the heart failure nurses in a week.  Vincent Barba

## 2021-06-23 NOTE — TELEPHONE ENCOUNTER
Called patient- no answer. Called patient's EC. Charo is in [place]. Relayed message to her and she will call her sister, Maame who was with the patient today at the appt. She is not listed as a contact on consent to communicate. Apparently the patient is usually sent with a form with any medication changes and updates and signs and gives back to the senior living. Tried calling [facility] to make sure they received changes; left voicemail. -[facility] [place]

## 2021-06-27 NOTE — PROGRESS NOTES
Progress Notes by Lisa Hammer CNP at 5/23/2019  3:30 PM     Author: Lisa Hammer CNP Service: -- Author Type: Nurse Practitioner    Filed: 5/23/2019  4:15 PM Encounter Date: 5/23/2019 Status: Signed    : Lisa Hammer CNP (Nurse Practitioner)           Click to link to [facility] Heart [facility] HEART CARE NOTE      Assessment/Recommendations   Assessment:    1. Ischemic cardiomyopathy with systolic dysfunction, LVEF 50 to 55%, NYHA class III: Compensated.  She continues to have fatigue and dyspnea on exertion.  Her weights have decreased per facility.  She states her appetite has decreased.    2.  Hypertension: Blood pressure controlled today 118/78    3.  Paroxysmal atrial fibrillation: Rate controlled.  She continues Eliquis for anticoagulation.    Plan:  1.  Continue current medications  2.  Continue daily weights and low-sodium diet    Grace Robb will follow up with Dr. Barba in August and in the heart failure clinic in 4 months or sooner if needed.     History of Present Illness    Ms. Grace Robb is a 89 y.o. female seen at [facility] heart failure clinic today for continued follow-up.  Her son Sergio accompanies her today.  She follows up for ischemic cardia myopathy with systolic dysfunction.  She had an echocardiogram in November 2018 which showed an improved ejection fraction of 50 to 55% along with moderate to severe tricuspid regurgitation.  She has a past medical history significant for hypertension, coronary artery disease, peripheral vascular disease, hyperlipidemia, persistent atrial fibrillation, COPD, and TIA.  Status post three-vessel coronary bypass graft in 2004.    Today, she comes in with continued fatigue and dyspnea on exertion.  Her lower extremity edema has improved.  She denies orthopnea or PND.  Denies any chest pain.  She denies lightheadedness, shortness of breath, orthopnea, PND, palpitations, chest pain, abdominal  fullness/bloating and lower extremity edema.      She monitors her weights daily.  Her facility states she has been losing weight.  She states today her appetite has decreased and she is eating less.  Her clinic weight is stable.       Physical Examination Review of Systems   Vitals:    05/23/19 1533   BP: 118/78   Pulse: 74   Resp: 16     Body mass index is 31.83 kg/m .  Wt Readings from Last 3 Encounters:   05/23/19 163 lb (73.9 kg)   05/10/19 160 lb (72.6 kg)   03/22/19 170 lb (77.1 kg)       General Appearance:     Alert, cooperative and in no acute distress.   ENT/Mouth: membranes moist, no oral lesions or bleeding gums.      EYES:  no scleral icterus, normal conjunctivae   Neck: no carotid bruits or thyromegaly   Chest/Lungs:   lungs are clear to auscultation, no rales or wheezing, respirations unlabored   Cardiovascular:    Irregularly irregular. Normal first and second heart sounds with no murmurs, rubs, or gallops; the carotid, radial and posterior tibial pulses are intact, Jugular venous pressure normal, no edema     Abdomen:  Soft, nontender, nondistended, bowel sounds present   Extremities: no cyanosis or clubbing   Skin: warm, dry.    Neurologic: mood and affect are appropriate, alert and oriented x3      General: Weight Loss  Eyes: WNL  Ears/Nose/Throat: WNL  Lungs: Shortness of Breath  Heart: WNL  Stomach: WNL  Bladder: Frequent Urination at Night  Muscle/Joints: Joint Pain  Skin: Poor Wound Healing  Nervous System: Dizziness, Loss of Balance  Mental Health: Anxiety     Blood: WNL     Medical History  Surgical History Family History Social History   Past Medical History:   Diagnosis Date   ? Atrial fibrillation (H)    ? CAD (coronary artery disease)    ? Dyslipidemia    ? Fall 10/06/2016   ? Hyperlipidemia    ? Hypertension    ? Laceration of head 10/06/2016   ? Osteoarthritis    ? Peripheral vascular disease (H)    ? Spinal stenosis    ? Transient ischemic attack     Past Surgical History:    Procedure Laterality Date   ? APPENDECTOMY     ? EYE SURGERY      L cataract   ? IN CABG, VEIN, SINGLE      Description: CABG (CABG);  Recorded: 01/12/2012;  Comments: Also subsequent stent placement after that with failure of her grafts   ? IN VEIN BYPASS GRAFT,FEM-POP      Description: Bypass Graft Using Vein: Femoral-popliteal;  Recorded: 01/12/2012;  Comments: gore-jenny was used not vein   ? SALIVARY GLAND SURGERY      Family History   Problem Relation Age of Onset   ? Cancer Mother    ? Stroke Father    ? COPD Sister    ? No Medical Problems Sister     Social History     Socioeconomic History   ? Marital status:      Spouse name: Not on file   ? Number of children: Not on file   ? Years of education: Not on file   ? Highest education level: Not on file   Occupational History   ? Not on file   Social Needs   ? Financial resource strain: Not on file   ? Food insecurity:     Worry: Not on file     Inability: Not on file   ? Transportation needs:     Medical: Not on file     Non-medical: Not on file   Tobacco Use   ? Smoking status: Former Smoker   ? Smokeless tobacco: Never Used   ? Tobacco comment: quit 30 yeqrs ago   Substance and Sexual Activity   ? Alcohol use: No   ? Drug use: No   ? Sexual activity: Not on file   Lifestyle   ? Physical activity:     Days per week: Not on file     Minutes per session: Not on file   ? Stress: Not on file   Relationships   ? Social connections:     Talks on phone: Not on file     Gets together: Not on file     Attends Anabaptist service: Not on file     Active member of club or organization: Not on file     Attends meetings of clubs or organizations: Not on file     Relationship status: Not on file   ? Intimate partner violence:     Fear of current or ex partner: Not on file     Emotionally abused: Not on file     Physically abused: Not on file     Forced sexual activity: Not on file   Other Topics Concern   ? Not on file   Social History Narrative    The patient is  retired.          Medications  Allergies   Current Outpatient Medications   Medication Sig Dispense Refill   ? acetaminophen (TYLENOL) 500 MG tablet Take 500 mg by mouth every 4 (four) hours as needed for pain.     ? apixaban (ELIQUIS) 5 mg Tab tablet Take 1 tablet (5 mg total) by mouth 2 (two) times a day. 180 tablet 1   ? atorvastatin (LIPITOR) 80 MG tablet Take 80 mg by mouth at bedtime. (Take half a tablet (40mg) at bedtime           ? cefdinir (OMNICEF) 300 MG capsule Take 300 mg by mouth 2 (two) times a day.     ? clopidogrel (PLAVIX) 75 mg tablet Take 75 mg by mouth every morning.      ? fenofibrate (TRICOR) 145 MG tablet Take 145 mg by mouth every morning.      ? glucosamine-chondroitin 500-400 mg cap Take 2 capsules by mouth daily .           ? guaiFENesin ER (MUCINEX) 600 mg 12 hr tablet Take 600 mg by mouth 2 (two) times a day as needed for congestion.     ? isosorbide mononitrate (IMDUR) 30 MG 24 hr tablet Take 30 mg by mouth every morning.      ? melatonin 3 mg Tab tablet Take 3 mg by mouth at bedtime.      ? metoprolol tartrate (LOPRESSOR) 25 MG tablet Take 0.5 tablets (12.5 mg total) by mouth 2 (two) times a day. 60 tablet 0   ? multivitamin therapeutic (THERAGRAN) tablet Take 1 tablet by mouth every morning.      ? nitroglycerin (NITROSTAT) 0.4 MG SL tablet Place 0.4 mg under the tongue every 5 (five) minutes as needed for chest pain.     ? oxybutynin (DITROPAN XL) 15 MG 24 hr tablet Take 15 mg by mouth every morning.      ? pantoprazole (PROTONIX) 20 MG tablet Take 20 mg by mouth every evening .        1   ? PARoxetine (PAXIL) 10 MG tablet Take 5 mg by mouth every morning.      ? potassium chloride (K-DUR,KLOR-CON) 20 MEQ tablet Take 1 tablet (20 mEq total) by mouth 2 (two) times a day. 60 tablet 1   ? traZODone (DESYREL) 50 MG tablet Take 25 mg by mouth at bedtime as needed for sleep (additional prn dose on top of regularly scheduled trazodone dose) .           ? bumetanide (BUMEX) 2 MG tablet Take  1 tablet (2 mg total) by mouth daily. 90 tablet 3     No current facility-administered medications for this visit.       No Known Allergies      Lab Results    Chemistry CBC BNP   Lab Results   Component Value Date    CREATININE 1.45 (H) 05/16/2019    BUN 30 (H) 05/16/2019     05/16/2019    K 3.9 05/16/2019     05/16/2019    CO2 26 05/16/2019     Creatinine (mg/dL)   Date Value   05/16/2019 1.45 (H)   05/10/2019 1.34 (H)   03/22/2019 1.56 (H)   02/11/2019 1.37 (H)    Lab Results   Component Value Date    WBC 5.1 11/28/2018    HGB 10.9 (L) 02/11/2019    HCT 38.0 11/28/2018     (H) 11/28/2018     11/28/2018    Lab Results   Component Value Date    BNP 1,038 (H) 05/10/2019     BNP (pg/mL)   Date Value   05/10/2019 1,038 (H)   02/11/2019 838 (H)   11/19/2018 445 (H)          25 minutes were spent with the patient with greater than 50% spent on education and counseling.      Lisa Hammer, [facility]   Heart Failure Clinic

## 2021-06-27 NOTE — PROGRESS NOTES
Progress Notes by Lisa Hammer CNP at 12/14/2018  1:30 PM     Author: Lisa Hammer CNP Service: -- Author Type: Nurse Practitioner    Filed: 12/14/2018  2:47 PM Encounter Date: 12/14/2018 Status: Signed    : Lisa Hammer CNP (Nurse Practitioner)           Click to link to [facility] Heart [facility] HEART CARE NOTE      Assessment/Recommendations   Assessment:    1.  Heart failure with preserved ejection fraction, NYHA class III: Decompensated.  She states she continues to have fatigue, abdominal bloating, lower extremity edema, and dyspnea on exertion.  Her weight is up 5 pounds since discharge.  She has been wearing compression stockings that her primary doctor recommended which has helped her lower extremity edema.  She lives in assisted living where her meals are provided.  Her daughter states they offer a low-sodium options.  We discussed monitoring heart failure symptoms, following a low-sodium diet, monitoring daily weights, and heart failure treatment.    2.  Hypertension: Controlled.  Blood pressure 128/60    3.  Paroxysmal atrial fibrillation: Rate controlled.  She continues Eliquis for anticoagulation.  She is also on metoprolol for rate control.    Plan:  1.  Increase Lasix to 60 mg daily  2.  BMP in 1 week ordered for assisted living to complete  3.  Continue daily weights and low-sodium diet  4.  Encouraged regular exercise    Grace Robb will follow up with Dr. Barba in 6 weeks and in the heart failure clinic in 3 months or sooner if needed.     History of Present Illness    [facility] post[facility] follow-up.  Her daughter accompanies her today. She was hospitalized at [facility] from November 18 - November 21, 2018 with acute hypoxic respiratory failure.  Her BNP on admission was 594.  She required BiPAP which improved her symptoms.  She also received IV diuretics and had  good response.  Her Lasix was increased at discharge to 40 mg daily.  The most recent evaluation of Her ejection fraction was 50-55% from an  echo on 11/18/2018.  Her echocardiogram also showed moderate to severe tricuspid regurgitation.  Her past medical history is also significant for hypertension, coronary artery disease, peripheral vascular disease, atrial fibrillation, dyslipidemia and COPD.  She is a history of TIA.  Status post three-vessel coronary artery bypass graft in 2004.    Since being discharged from the hospital, Grace feels that she is improving.  She continues to have fatigue, dyspnea on exertion, abdominal bloating, and lower extremity edema.  She states she had orthopnea prior to hospitalization which has resolved. She denies shortness of breath, orthopnea, PND, palpitations and chest pain.      Grace Hogue weight at discharge was 176 pounds.  She is monitoring home weights which have increased by 5 pounds since discharge.  She is following a low sodium diet.  She lives at [facility] in their assisted living.  Her meals are provided for her.    ECHO 11/18/2018:   Summary       Normal left ventricular size. Mild left ventricular hypertrophy with sigmoid shaped septum    Left ventricular systolic function lower limits of normal. Left ventricular ejection fraction estimated 50-55%.    E/e' > 15, suggesting elevated LV filling pressures    Mild right ventricular enlargement suggested. Right ventricular function appears mildly reduced. Decreased Tapse consistent with reduced right ventricular function.    Moderate right atrial enlargement. Mild left atrial enlargement    Moderate to severe tricuspid insufficiency. Estimate of RV systolic pressure is mildly elevated at 38 mmHg plus right atrial pressure    Dilated IVC consistent with elevated central venous pressure.    When compared to the previous study dated 4/28/2017, left ventricular systolic function is mildly less vigorous. Right  ventricular enlargement, right atrial enlargement now noted. Tricuspid insufficiency more pronounced          Physical Examination Review of Systems   Vitals:    12/14/18 1354   BP: 128/60   Pulse: (!) 58   Resp: 12     Body mass index is 34.18 kg/m .  Wt Readings from Last 3 Encounters:   12/14/18 175 lb (79.4 kg)   11/20/18 176 lb 6.4 oz (80 kg)   07/31/18 175 lb (79.4 kg)       General Appearance:     Alert, cooperative and in no acute distress.   ENT/Mouth: membranes moist, no oral lesions or bleeding gums.      EYES:  no scleral icterus, normal conjunctivae   Neck: no carotid bruits or thyromegaly   Chest/Lungs:   lungs are clear to auscultation, no rales or wheezing, respirations unlabored   Cardiovascular:    Irregularly irregular. Normal first and second heart sounds with no murmurs, rubs, or gallops; the carotid, radial and posterior tibial pulses are intact, Jugular venous pressure normal, trace edema bilateral lower extremities, wearing compression stockings   Abdomen:  Soft, nontender, nondistended, bowel sounds present   Extremities: no cyanosis or clubbing   Skin: warm, dry.    Neurologic: mood and affect are appropriate, alert and oriented x3      General: Weight Gain  Eyes: Visual Distubance  Ears/Nose/Throat: Hearing Loss  Lungs: Cough  Heart: Leg Swelling  Stomach: WNL  Bladder: WNL        Nervous System: Loss of Balance, Dizziness, Daytime Sleepiness  Mental Health: Confusion     Blood: WNL     Medical History  Surgical History Family History Social History   Past Medical History:   Diagnosis Date   ? Atrial fibrillation (H)    ? CAD (coronary artery disease)    ? Dyslipidemia    ? Fall 10/06/2016   ? Hyperlipidemia    ? Hypertension    ? Laceration of head 10/06/2016   ? Osteoarthritis    ? Peripheral vascular disease (H)    ? Spinal stenosis    ? Transient ischemic attack     Past Surgical History:   Procedure Laterality Date   ? APPENDECTOMY     ? EYE SURGERY      L cataract   ? [place] CABG, VEIN,  SINGLE      Description: CABG (CABG);  Recorded: 01/12/2012;  Comments: Also subsequent stent placement after that with failure of her grafts   ? [place] VEIN BYPASS GRAFT,FEM-POP      Description: Bypass Graft Using Vein: Femoral-popliteal;  Recorded: 01/12/2012;  Comments: gore-jenny was used not vein   ? SALIVARY GLAND SURGERY      Family History   Problem Relation Age of Onset   ? Cancer Mother    ? Stroke Father    ? COPD Sister    ? No Medical Problems Sister     Social History     Socioeconomic History   ? Marital status:      Spouse name: Not on file   ? Number of children: Not on file   ? Years of education: Not on file   ? Highest education level: Not on file   Social Needs   ? Financial resource strain: Not on file   ? Food insecurity - worry: Not on file   ? Food insecurity - inability: Not on file   ? Transportation needs - medical: Not on file   ? Transportation needs - non-medical: Not on file   Occupational History   ? Not on file   Tobacco Use   ? Smoking status: Former Smoker   ? Smokeless tobacco: Never Used   ? Tobacco comment: quit 30 yeqrs ago   Substance and Sexual Activity   ? Alcohol use: No   ? Drug use: No   ? Sexual activity: Not on file   Other Topics Concern   ? Not on file   Social History Narrative    The patient is retired.          Medications  Allergies   Current Outpatient Medications   Medication Sig Dispense Refill   ? acetaminophen (TYLENOL) 500 MG tablet Take 500 mg by mouth every 4 (four) hours as needed for pain.     ? apixaban (ELIQUIS) 5 mg Tab tablet Take 1 tablet (5 mg total) by mouth 2 (two) times a day. 180 tablet 1   ? atorvastatin (LIPITOR) 80 MG tablet Take 40 mg by mouth at bedtime. (Take half a tablet (40mg) at bedtime     ? clopidogrel (PLAVIX) 75 mg tablet Take 75 mg by mouth every morning.      ? fenofibrate (TRICOR) 145 MG tablet Take 145 mg by mouth every morning.      ? furosemide (LASIX) 20 MG tablet Take 3 tablets (60 mg total) by mouth daily. 270 tablet 0    ? glucosamine-chondroitin 500-400 mg cap Take 2 capsules by mouth daily .           ? guaiFENesin ER (MUCINEX) 600 mg 12 hr tablet Take 600 mg by mouth 2 (two) times a day as needed for congestion.     ? isosorbide mononitrate (IMDUR) 30 MG 24 hr tablet Take 30 mg by mouth every morning.      ? melatonin 3 mg Tab tablet Take 3 mg by mouth at bedtime.      ? metoprolol tartrate (LOPRESSOR) 25 MG tablet Take 0.5 tablets (12.5 mg total) by mouth 2 (two) times a day. 60 tablet 0   ? multivitamin therapeutic (THERAGRAN) tablet Take 1 tablet by mouth every morning.      ? nitroglycerin (NITROSTAT) 0.4 MG SL tablet Place 0.4 mg under the tongue every 5 (five) minutes as needed for chest pain.     ? oxybutynin (DITROPAN XL) 15 MG 24 hr tablet Take 15 mg by mouth every morning.      ? pantoprazole (PROTONIX) 20 MG tablet Take 20 mg by mouth every evening .        1   ? PARoxetine (PAXIL) 10 MG tablet Take 5 mg by mouth every morning.      ? traZODone (DESYREL) 50 MG tablet Take 25 mg by mouth at bedtime as needed for sleep (additional prn dose on top of regularly scheduled trazodone dose) .           ? traZODone (DESYREL) 50 MG tablet TAKE 0.5 TABLET(S) BY MOUTH AT BEDTIME  1     No current facility-administered medications for this visit.       No Known Allergies      Lab Results    Chemistry CBC BNP   Lab Results   Component Value Date    CREATININE 1.44 (H) 12/11/2018    BUN 34 (H) 12/11/2018     12/11/2018    K 4.1 12/11/2018     12/11/2018    CO2 28 12/11/2018     Creatinine (mg/dL)   Date Value   12/11/2018 1.44 (H)   12/05/2018 1.31 (H)   11/28/2018 1.13 (H)   11/21/2018 1.18 (H)    Lab Results   Component Value Date    WBC 5.1 11/28/2018    HGB 12.3 11/28/2018    HCT 38.0 11/28/2018     (H) 11/28/2018     11/28/2018    Lab Results   Component Value Date     (H) 11/19/2018     BNP (pg/mL)   Date Value   11/19/2018 445 (H)   11/18/2018 594 (H)   08/07/2018 935 (H)          40 minutes  were spent with the patient with greater than 50% spent on education and counseling.      Lisa Hammer, [facility]   Heart Failure Clinic

## 2021-06-27 NOTE — PROGRESS NOTES
Progress Notes by Lisa Hammer CNP at 3/22/2019  1:30 PM     Author: Lisa Hammer CNP Service: -- Author Type: Nurse Practitioner    Filed: 3/22/2019  2:38 PM Encounter Date: 3/22/2019 Status: Signed    : Lisa Hammer CNP (Nurse Practitioner)           Click to link to [facility] HEART [facility] NOTE      Assessment/Recommendations   Assessment:    1.  Heart failure with preserved ejection fraction, NYHA class III: Compensated.  She continues to have fatigue and dyspnea on exertion.  She has trace lower extremity edema but states this has improved.  Her weights have remained stable.  She tries to follow a low-sodium diet as best she can.    2.  Hypertension: Controlled.  Blood pressure 114/74    3.  Paroxysmal atrial fibrillation: Rate controlled.  She continues Eliquis for anticoagulation.  She is on Lopressor for rate control.    Plan:  1.  Change Bumex to 2 mg daily instead of twice a day as she has been urinating at night and frustrated with this  2.  BMP pending  3.  Continue daily weights and low-sodium diet    Grace Robb will follow up with Dr. Barba in May and in the heart failure clinic in 4 months.     History of Present Illness    Ms. Grace Robb is a 89 y.o. female seen at [facility] heart failure clinic today for continued follow-up.  Her daughter accompanies her today.  She follows up for heart failure with preserved ejection fraction.  She had an echocardiogram November 2018 which showed an ejection fraction of 50-55% with moderate to severe tricuspid regurgitation.  She has a past medical history significant for hypertension, coronary artery disease, peripheral vascular disease, atrial fibrillation, dyslipidemia, and COPD.  She is a history of a TIA.  Status post three-vessel coronary artery bypass graft in 2004.    During the last clinic visit, Dr. Barba change her Lasix to Bumex.  Her daughter states that she dropped  5 pounds with that change.  She continues to have fatigue and dyspnea on exertion.  She states her lower extremity edema improved.  She denies orthopnea or PND.  She denies lightheadedness, shortness of breath, orthopnea, PND, palpitations, chest pain and abdominal fullness/bloating.      She is monitoring home weights which are stable between 168-170 pounds.  She is following a low sodium diet.  She lives in [facility].       Physical Examination Review of Systems   Vitals:    03/22/19 1349   BP: 114/74   Pulse: 68   Resp: 16     Body mass index is 33.2 kg/m .  Wt Readings from Last 3 Encounters:   03/22/19 170 lb (77.1 kg)   02/11/19 171 lb (77.6 kg)   12/14/18 175 lb (79.4 kg)       General Appearance:     Alert, cooperative and in no acute distress.   ENT/Mouth: membranes moist, no oral lesions or bleeding gums.      EYES:  no scleral icterus, normal conjunctivae   Neck: no carotid bruits or thyromegaly   Chest/Lungs:   lungs are clear to auscultation, no rales or wheezing, respirations unlabored   Cardiovascular:    Irregularly irregular. Normal first and second heart sounds with no murmurs, rubs, or gallops; the carotid, radial and posterior tibial pulses are intact, Jugular venous pressure normal, trace edema bilateral lower extremities    Abdomen:  Soft, nontender, nondistended, bowel sounds present   Extremities: no cyanosis or clubbing   Skin: warm, dry.    Neurologic: mood and affect are appropriate, alert and oriented x3      General: WNL  Eyes: Visual Distubance  Ears/Nose/Throat: WNL  Lungs: WNL  Heart: WNL  Stomach: WNL  Bladder: WNL  Muscle/Joints: WNL  Skin: WNL  Nervous System: WNL  Mental Health: WNL     Blood: Easy Bleeding     Medical History  Surgical History Family History Social History   Past Medical History:   Diagnosis Date   ? Atrial fibrillation (H)    ? CAD (coronary artery disease)    ? Dyslipidemia    ? Fall 10/06/2016   ? Hyperlipidemia    ? Hypertension    ? Laceration of head  10/06/2016   ? Osteoarthritis    ? Peripheral vascular disease (H)    ? Spinal stenosis    ? Transient ischemic attack     Past Surgical History:   Procedure Laterality Date   ? APPENDECTOMY     ? EYE SURGERY      L cataract   ? [place] CABG, VEIN, SINGLE      Description: CABG (CABG);  Recorded: 01/12/2012;  Comments: Also subsequent stent placement after that with failure of her grafts   ? [place] VEIN BYPASS GRAFT,FEM-POP      Description: Bypass Graft Using Vein: Femoral-popliteal;  Recorded: 01/12/2012;  Comments: gore-jenny was used not vein   ? SALIVARY GLAND SURGERY      Family History   Problem Relation Age of Onset   ? Cancer Mother    ? Stroke Father    ? COPD Sister    ? No Medical Problems Sister     Social History     Socioeconomic History   ? Marital status:      Spouse name: Not on file   ? Number of children: Not on file   ? Years of education: Not on file   ? Highest education level: Not on file   Occupational History   ? Not on file   Social Needs   ? Financial resource strain: Not on file   ? Food insecurity:     Worry: Not on file     Inability: Not on file   ? Transportation needs:     Medical: Not on file     Non-medical: Not on file   Tobacco Use   ? Smoking status: Former Smoker   ? Smokeless tobacco: Never Used   ? Tobacco comment: quit 30 yeqrs ago   Substance and Sexual Activity   ? Alcohol use: No   ? Drug use: No   ? Sexual activity: Not on file   Lifestyle   ? Physical activity:     Days per week: Not on file     Minutes per session: Not on file   ? Stress: Not on file   Relationships   ? Social connections:     Talks on phone: Not on file     Gets together: Not on file     Attends Muslim service: Not on file     Active member of club or organization: Not on file     Attends meetings of clubs or organizations: Not on file     Relationship status: Not on file   ? Intimate partner violence:     Fear of current or ex partner: Not on file     Emotionally abused: Not on file     Physically  abused: Not on file     Forced sexual activity: Not on file   Other Topics Concern   ? Not on file   Social History Narrative    The patient is retired.          Medications  Allergies   Current Outpatient Medications   Medication Sig Dispense Refill   ? acetaminophen (TYLENOL) 500 MG tablet Take 500 mg by mouth every 4 (four) hours as needed for pain.     ? apixaban (ELIQUIS) 5 mg Tab tablet Take 1 tablet (5 mg total) by mouth 2 (two) times a day. 180 tablet 1   ? atorvastatin (LIPITOR) 80 MG tablet Take 80 mg by mouth at bedtime. (Take half a tablet (40mg) at bedtime           ? bumetanide (BUMEX) 1 MG tablet Take 2 tablets (2 mg total) by mouth daily. 180 tablet 3   ? clopidogrel (PLAVIX) 75 mg tablet Take 75 mg by mouth every morning.      ? fenofibrate (TRICOR) 145 MG tablet Take 145 mg by mouth every morning.      ? glucosamine-chondroitin 500-400 mg cap Take 2 capsules by mouth daily .           ? guaiFENesin ER (MUCINEX) 600 mg 12 hr tablet Take 600 mg by mouth 2 (two) times a day as needed for congestion.     ? isosorbide mononitrate (IMDUR) 30 MG 24 hr tablet Take 30 mg by mouth every morning.      ? melatonin 3 mg Tab tablet Take 3 mg by mouth at bedtime.      ? metoprolol tartrate (LOPRESSOR) 25 MG tablet Take 0.5 tablets (12.5 mg total) by mouth 2 (two) times a day. 60 tablet 0   ? multivitamin therapeutic (THERAGRAN) tablet Take 1 tablet by mouth every morning.      ? nitroglycerin (NITROSTAT) 0.4 MG SL tablet Place 0.4 mg under the tongue every 5 (five) minutes as needed for chest pain.     ? oxybutynin (DITROPAN XL) 15 MG 24 hr tablet Take 15 mg by mouth every morning.      ? pantoprazole (PROTONIX) 20 MG tablet Take 20 mg by mouth every evening .        1   ? PARoxetine (PAXIL) 10 MG tablet Take 5 mg by mouth every morning.      ? traZODone (DESYREL) 50 MG tablet Take 25 mg by mouth at bedtime as needed for sleep (additional prn dose on top of regularly scheduled trazodone dose) .           ?  traZODone (DESYREL) 50 MG tablet TAKE 0.5 TABLET(S) BY MOUTH AT BEDTIME  1     No current facility-administered medications for this visit.       No Known Allergies      Lab Results    Chemistry CBC BNP   Lab Results   Component Value Date    CREATININE 1.37 (H) 02/11/2019    BUN 34 (H) 02/11/2019     02/11/2019    K 4.0 02/11/2019     (H) 02/11/2019    CO2 27 02/11/2019     Creatinine (mg/dL)   Date Value   02/11/2019 1.37 (H)   12/24/2018 1.52 (H)   12/11/2018 1.44 (H)   12/05/2018 1.31 (H)    Lab Results   Component Value Date    WBC 5.1 11/28/2018    HGB 10.9 (L) 02/11/2019    HCT 38.0 11/28/2018     (H) 11/28/2018     11/28/2018    Lab Results   Component Value Date     (H) 02/11/2019     BNP (pg/mL)   Date Value   02/11/2019 838 (H)   11/19/2018 445 (H)   11/18/2018 594 (H)          25 minutes were spent with the patient with greater than 50% spent on education and counseling.      Lisa Hammer, [facility]   Heart Failure Clinic

## 2021-07-03 NOTE — ADDENDUM NOTE
Addendum Note by Tami Valenzuela, RN at 5/14/2019  4:48 PM     Author: Tami Valenzuela RN Service: -- Author Type: Registered Nurse    Filed: 5/14/2019  4:48 PM Encounter Date: 5/13/2019 Status: Signed    : Tami Valenzuela RN (Registered Nurse)    Addended by: TAMI VALENZUELA on: 5/14/2019 04:48 PM        Modules accepted: Orders

## 2021-07-03 NOTE — ADDENDUM NOTE
Addendum Note by Tami Valenzuela, RN at 5/14/2019 12:21 PM     Author: Tami Valenzuela RN Service: -- Author Type: Registered Nurse    Filed: 5/14/2019 12:21 PM Encounter Date: 5/13/2019 Status: Signed    : Tami Valenzuela RN (Registered Nurse)    Addended by: TAMI VALENZUELA on: 5/14/2019 12:21 PM        Modules accepted: Orders

## 2021-07-21 ENCOUNTER — RECORDS - HEALTHEAST (OUTPATIENT)
Dept: ADMINISTRATIVE | Facility: CLINIC | Age: 86
End: 2021-07-21